# Patient Record
Sex: FEMALE | Race: BLACK OR AFRICAN AMERICAN | NOT HISPANIC OR LATINO
[De-identification: names, ages, dates, MRNs, and addresses within clinical notes are randomized per-mention and may not be internally consistent; named-entity substitution may affect disease eponyms.]

---

## 2017-09-22 ENCOUNTER — APPOINTMENT (OUTPATIENT)
Dept: OBGYN | Facility: CLINIC | Age: 22
End: 2017-09-22
Payer: COMMERCIAL

## 2017-09-22 VITALS
BODY MASS INDEX: 36 KG/M2 | SYSTOLIC BLOOD PRESSURE: 102 MMHG | HEIGHT: 66 IN | DIASTOLIC BLOOD PRESSURE: 67 MMHG | WEIGHT: 224 LBS | HEART RATE: 80 BPM

## 2017-09-22 DIAGNOSIS — Z83.3 FAMILY HISTORY OF DIABETES MELLITUS: ICD-10-CM

## 2017-09-22 DIAGNOSIS — Z30.09 ENCOUNTER FOR OTHER GENERAL COUNSELING AND ADVICE ON CONTRACEPTION: ICD-10-CM

## 2017-09-22 DIAGNOSIS — Z82.49 FAMILY HISTORY OF ISCHEMIC HEART DISEASE AND OTHER DISEASES OF THE CIRCULATORY SYSTEM: ICD-10-CM

## 2017-09-22 DIAGNOSIS — Z11.3 ENCOUNTER FOR SCREENING FOR INFECTIONS WITH A PREDOMINANTLY SEXUAL MODE OF TRANSMISSION: ICD-10-CM

## 2017-09-22 PROCEDURE — 99385 PREV VISIT NEW AGE 18-39: CPT

## 2017-09-24 PROBLEM — Z83.3 FAMILY HISTORY OF DIABETES MELLITUS: Status: ACTIVE | Noted: 2017-09-24

## 2017-09-24 PROBLEM — Z11.3 SCREENING FOR STDS (SEXUALLY TRANSMITTED DISEASES): Status: ACTIVE | Noted: 2017-09-22

## 2017-09-24 PROBLEM — Z30.09 COUNSELING FOR BIRTH CONTROL, ORAL CONTRACEPTIVES: Status: ACTIVE | Noted: 2017-09-22

## 2017-09-24 PROBLEM — Z82.49 FAMILY HISTORY OF HYPERTENSION: Status: ACTIVE | Noted: 2017-09-24

## 2017-09-26 LAB
C TRACH RRNA SPEC QL NAA+PROBE: NORMAL
CANDIDA VAG CYTO: NOT DETECTED
G VAGINALIS+PREV SP MTYP VAG QL MICRO: DETECTED
HBV SURFACE AG SER QL: NONREACTIVE
HCV AB SER QL: NONREACTIVE
HCV S/CO RATIO: 0.35 S/CO
HIV1+2 AB SPEC QL IA.RAPID: NONREACTIVE
N GONORRHOEA RRNA SPEC QL NAA+PROBE: NORMAL
SOURCE AMPLIFICATION: NORMAL
T PALLIDUM AB SER QL IA: NEGATIVE
T VAGINALIS VAG QL WET PREP: NOT DETECTED

## 2017-09-27 DIAGNOSIS — B96.89 ACUTE VAGINITIS: ICD-10-CM

## 2017-09-27 DIAGNOSIS — N76.0 ACUTE VAGINITIS: ICD-10-CM

## 2017-09-28 LAB — CYTOLOGY CVX/VAG DOC THIN PREP: NORMAL

## 2018-08-10 ENCOUNTER — RX RENEWAL (OUTPATIENT)
Age: 23
End: 2018-08-10

## 2019-02-06 ENCOUNTER — RX RENEWAL (OUTPATIENT)
Age: 24
End: 2019-02-06

## 2019-07-20 ENCOUNTER — RX RENEWAL (OUTPATIENT)
Age: 24
End: 2019-07-20

## 2019-09-17 ENCOUNTER — OTHER (OUTPATIENT)
Age: 24
End: 2019-09-17

## 2019-09-17 ENCOUNTER — APPOINTMENT (OUTPATIENT)
Dept: OBGYN | Facility: CLINIC | Age: 24
End: 2019-09-17
Payer: COMMERCIAL

## 2019-09-17 VITALS
DIASTOLIC BLOOD PRESSURE: 82 MMHG | HEART RATE: 69 BPM | SYSTOLIC BLOOD PRESSURE: 118 MMHG | HEIGHT: 66 IN | BODY MASS INDEX: 38.89 KG/M2 | WEIGHT: 242 LBS

## 2019-09-17 DIAGNOSIS — Z30.41 ENCOUNTER FOR SURVEILLANCE OF CONTRACEPTIVE PILLS: ICD-10-CM

## 2019-09-17 DIAGNOSIS — Z23 ENCOUNTER FOR IMMUNIZATION: ICD-10-CM

## 2019-09-17 DIAGNOSIS — N92.6 IRREGULAR MENSTRUATION, UNSPECIFIED: ICD-10-CM

## 2019-09-17 PROCEDURE — 99395 PREV VISIT EST AGE 18-39: CPT | Mod: 25

## 2019-09-17 PROCEDURE — 90649 4VHPV VACCINE 3 DOSE IM: CPT

## 2019-09-17 PROCEDURE — 90471 IMMUNIZATION ADMIN: CPT

## 2019-09-17 PROCEDURE — 99213 OFFICE O/P EST LOW 20 MIN: CPT | Mod: 25

## 2019-09-17 NOTE — COUNSELING
[Breast Self Exam] : breast self exam [Exercise] : exercise [Nutrition] : nutrition [Vitamins/Supplements] : vitamins/supplements [Contraception] : contraception [Vaccines] : vaccines

## 2019-09-19 LAB
C TRACH RRNA SPEC QL NAA+PROBE: DETECTED
CANDIDA VAG CYTO: NOT DETECTED
G VAGINALIS+PREV SP MTYP VAG QL MICRO: DETECTED
N GONORRHOEA RRNA SPEC QL NAA+PROBE: NOT DETECTED
SOURCE AMPLIFICATION: NORMAL
T VAGINALIS VAG QL WET PREP: NOT DETECTED

## 2019-09-26 ENCOUNTER — OTHER (OUTPATIENT)
Age: 24
End: 2019-09-26

## 2019-09-26 LAB — CYTOLOGY CVX/VAG DOC THIN PREP: ABNORMAL

## 2019-10-15 ENCOUNTER — OTHER (OUTPATIENT)
Age: 24
End: 2019-10-15

## 2019-10-29 ENCOUNTER — APPOINTMENT (OUTPATIENT)
Dept: OBGYN | Facility: CLINIC | Age: 24
End: 2019-10-29
Payer: COMMERCIAL

## 2019-10-29 VITALS
WEIGHT: 248 LBS | BODY MASS INDEX: 39.86 KG/M2 | DIASTOLIC BLOOD PRESSURE: 79 MMHG | HEIGHT: 66 IN | HEART RATE: 93 BPM | SYSTOLIC BLOOD PRESSURE: 127 MMHG

## 2019-10-29 PROCEDURE — 99213 OFFICE O/P EST LOW 20 MIN: CPT

## 2019-10-29 RX ORDER — METRONIDAZOLE 7.5 MG/G
0.75 GEL VAGINAL
Qty: 1 | Refills: 0 | Status: DISCONTINUED | COMMUNITY
Start: 2019-09-20 | End: 2019-10-29

## 2019-10-29 RX ORDER — METRONIDAZOLE 7.5 MG/G
0.75 GEL VAGINAL
Qty: 1 | Refills: 0 | Status: DISCONTINUED | COMMUNITY
Start: 2017-09-27 | End: 2019-10-29

## 2019-10-30 LAB
C TRACH RRNA SPEC QL NAA+PROBE: NOT DETECTED
N GONORRHOEA RRNA SPEC QL NAA+PROBE: NOT DETECTED
SOURCE AMPLIFICATION: NORMAL

## 2019-11-19 ENCOUNTER — APPOINTMENT (OUTPATIENT)
Dept: OBGYN | Facility: CLINIC | Age: 24
End: 2019-11-19

## 2019-11-26 ENCOUNTER — RESULT CHARGE (OUTPATIENT)
Age: 24
End: 2019-11-26

## 2019-11-26 ENCOUNTER — APPOINTMENT (OUTPATIENT)
Dept: OBGYN | Facility: CLINIC | Age: 24
End: 2019-11-26
Payer: COMMERCIAL

## 2019-11-26 VITALS
SYSTOLIC BLOOD PRESSURE: 117 MMHG | WEIGHT: 251 LBS | HEART RATE: 80 BPM | HEIGHT: 66 IN | BODY MASS INDEX: 40.34 KG/M2 | DIASTOLIC BLOOD PRESSURE: 77 MMHG

## 2019-11-26 LAB
HCG UR QL: NEGATIVE
QUALITY CONTROL: YES

## 2019-11-26 PROCEDURE — 90471 IMMUNIZATION ADMIN: CPT

## 2019-11-26 PROCEDURE — 57456 ENDOCERV CURETTAGE W/SCOPE: CPT

## 2019-11-26 PROCEDURE — 90651 9VHPV VACCINE 2/3 DOSE IM: CPT

## 2019-11-26 NOTE — PROCEDURE
[Colposcopy] : colposcopy [LGSIL] : low grade squamous intraepithelial lesion [SCJ Fully Visulized] : the squamocolumnar junction was fully visualized [ECC Done] : Endocervical curettage was performed.  [Silver Nitrate] : silver nitrate [Tolerated Well] : the patient tolerated the procedure well [No Complications] : there were no complications [Pap Performed] : a cervical Pap smear was not performed [Biopsies Taken: # ___] : no biopsies were taken of the cervix

## 2019-11-30 LAB — CORE LAB BIOPSY: NORMAL

## 2020-03-06 ENCOUNTER — APPOINTMENT (OUTPATIENT)
Dept: OBGYN | Facility: CLINIC | Age: 25
End: 2020-03-06

## 2020-03-11 ENCOUNTER — APPOINTMENT (OUTPATIENT)
Dept: OBGYN | Facility: CLINIC | Age: 25
End: 2020-03-11
Payer: COMMERCIAL

## 2020-03-11 PROCEDURE — 90471 IMMUNIZATION ADMIN: CPT

## 2020-03-11 PROCEDURE — 90649 4VHPV VACCINE 3 DOSE IM: CPT

## 2020-09-06 ENCOUNTER — RX RENEWAL (OUTPATIENT)
Age: 25
End: 2020-09-06

## 2020-12-15 PROBLEM — N76.0 BACTERIAL VAGINOSIS: Status: RESOLVED | Noted: 2017-09-27 | Resolved: 2020-12-15

## 2021-08-11 LAB — SARS-COV-2 N GENE NPH QL NAA+PROBE: NOT DETECTED

## 2021-10-05 ENCOUNTER — APPOINTMENT (OUTPATIENT)
Dept: OBGYN | Facility: CLINIC | Age: 26
End: 2021-10-05
Payer: SELF-PAY

## 2021-10-05 ENCOUNTER — ASOB RESULT (OUTPATIENT)
Age: 26
End: 2021-10-05

## 2021-10-05 ENCOUNTER — APPOINTMENT (OUTPATIENT)
Dept: OBGYN | Facility: CLINIC | Age: 26
End: 2021-10-05

## 2021-10-05 VITALS
WEIGHT: 247 LBS | DIASTOLIC BLOOD PRESSURE: 75 MMHG | HEART RATE: 71 BPM | SYSTOLIC BLOOD PRESSURE: 117 MMHG | TEMPERATURE: 96.7 F | BODY MASS INDEX: 39.7 KG/M2 | HEIGHT: 66 IN

## 2021-10-05 DIAGNOSIS — R87.612 LOW GRADE SQUAMOUS INTRAEPITHELIAL LESION ON CYTOLOGIC SMEAR OF CERVIX (LGSIL): ICD-10-CM

## 2021-10-05 DIAGNOSIS — E66.9 OBESITY, UNSPECIFIED: ICD-10-CM

## 2021-10-05 DIAGNOSIS — Z01.419 ENCOUNTER FOR GYNECOLOGICAL EXAMINATION (GENERAL) (ROUTINE) W/OUT ABNORMAL FINDINGS: ICD-10-CM

## 2021-10-05 PROCEDURE — 99395 PREV VISIT EST AGE 18-39: CPT

## 2021-10-05 PROCEDURE — 76817 TRANSVAGINAL US OBSTETRIC: CPT

## 2021-10-05 PROCEDURE — 99072 ADDL SUPL MATRL&STAF TM PHE: CPT

## 2021-10-05 NOTE — HISTORY OF PRESENT ILLNESS
[FreeTextEntry1] : 25 y.o. P 0010  LnMp 8/5/21 presents for annual exam. and confirmation of pregnancy, . pt reports monthly cycles. . pt feels  well. PMH - negative, PSHx - negative, FH - Hypercholesterolemia, Diabetes - pat. aunt. PGM, HTN- paternal side of family, mother was adopted. SH - negative, , GYN hx -  ETOP x 1. , + Chlamydia in 2019, LSIL on pap in 2019, \par ROS- [place] [facility]. - Cardiology,

## 2021-10-05 NOTE — DISCUSSION/SUMMARY
[FreeTextEntry1] : A - WWV\par      early iUP\par     obesity\par \par P- f/u 1 week for confirmation of viability \par      pap done\par      lab work done \par      exercise encouraged\par     nutritional counseling provided

## 2021-10-06 LAB
C TRACH RRNA SPEC QL NAA+PROBE: NOT DETECTED
HBV SURFACE AG SER QL: NONREACTIVE
HCV AB SER QL: NONREACTIVE
HCV S/CO RATIO: 0.46 S/CO
HIV1+2 AB SPEC QL IA.RAPID: NONREACTIVE
N GONORRHOEA RRNA SPEC QL NAA+PROBE: NOT DETECTED
SOURCE AMPLIFICATION: NORMAL
T PALLIDUM AB SER QL IA: NEGATIVE

## 2021-10-08 ENCOUNTER — APPOINTMENT (OUTPATIENT)
Dept: OBGYN | Facility: CLINIC | Age: 26
End: 2021-10-08
Payer: SELF-PAY

## 2021-10-08 ENCOUNTER — ASOB RESULT (OUTPATIENT)
Age: 26
End: 2021-10-08

## 2021-10-08 VITALS
HEIGHT: 66 IN | SYSTOLIC BLOOD PRESSURE: 134 MMHG | HEART RATE: 71 BPM | WEIGHT: 245 LBS | BODY MASS INDEX: 39.37 KG/M2 | DIASTOLIC BLOOD PRESSURE: 76 MMHG

## 2021-10-08 LAB — BACTERIA UR CULT: NORMAL

## 2021-10-08 PROCEDURE — 76830 TRANSVAGINAL US NON-OB: CPT

## 2021-10-08 PROCEDURE — 99072 ADDL SUPL MATRL&STAF TM PHE: CPT

## 2021-10-08 PROCEDURE — 99213 OFFICE O/P EST LOW 20 MIN: CPT

## 2021-10-08 NOTE — DISCUSSION/SUMMARY
[FreeTextEntry1] : A - Threatened Ab\par \par P- - sono today, shows viable, IUP at 6 weeks 2 days, \par      EDC by sono is 22. \par    pt counselled on Threatened , and sxs for which to call back for over the weekend. pt has appt. on Monday, 10/11 for repeat sono\par     initial prenatal labs not drawn as of yet. \par     will continue to observe for risk of spont. ab.

## 2021-10-08 NOTE — HISTORY OF PRESENT ILLNESS
[FreeTextEntry1] : pt presents for follow up, , recently seen on 10/5/21, pt experienced vaginal bleeding, with passage of clots. over the past 48 hrs. pt is not actively bleeding now.

## 2021-10-11 ENCOUNTER — APPOINTMENT (OUTPATIENT)
Dept: OBGYN | Facility: CLINIC | Age: 26
End: 2021-10-11
Payer: SELF-PAY

## 2021-10-11 ENCOUNTER — ASOB RESULT (OUTPATIENT)
Age: 26
End: 2021-10-11

## 2021-10-11 VITALS
DIASTOLIC BLOOD PRESSURE: 78 MMHG | WEIGHT: 244 LBS | HEIGHT: 66 IN | BODY MASS INDEX: 39.21 KG/M2 | SYSTOLIC BLOOD PRESSURE: 113 MMHG | HEART RATE: 69 BPM

## 2021-10-11 DIAGNOSIS — O20.0 THREATENED ABORTION: ICD-10-CM

## 2021-10-11 LAB — CYTOLOGY CVX/VAG DOC THIN PREP: ABNORMAL

## 2021-10-11 PROCEDURE — 76817 TRANSVAGINAL US OBSTETRIC: CPT

## 2021-10-11 PROCEDURE — 99072 ADDL SUPL MATRL&STAF TM PHE: CPT

## 2021-10-11 PROCEDURE — 99213 OFFICE O/P EST LOW 20 MIN: CPT

## 2021-10-11 NOTE — HISTORY OF PRESENT ILLNESS
[FreeTextEntry1] : pt presents  for follow up, pt reports no significant interval change over the weekend, in terms of vaginal bleeding,

## 2021-10-11 NOTE — DISCUSSION/SUMMARY
[FreeTextEntry1] : A - Threatened Ab\par \par P- sono today, shows no significant, change between the two sonos done last week. \par      FHR  is intermittent and faint. \par     pt and partner counselled that findings are suggestive of an early miscarriage, waiting to happen.\par     all questions answered, \par     f/u 1 week for sono and RPA.

## 2021-10-19 ENCOUNTER — APPOINTMENT (OUTPATIENT)
Dept: OBGYN | Facility: CLINIC | Age: 26
End: 2021-10-19
Payer: SELF-PAY

## 2021-10-19 ENCOUNTER — ASOB RESULT (OUTPATIENT)
Age: 26
End: 2021-10-19

## 2021-10-19 VITALS
SYSTOLIC BLOOD PRESSURE: 124 MMHG | HEIGHT: 66 IN | DIASTOLIC BLOOD PRESSURE: 79 MMHG | BODY MASS INDEX: 40.02 KG/M2 | WEIGHT: 249 LBS | HEART RATE: 72 BPM

## 2021-10-19 PROCEDURE — 99072 ADDL SUPL MATRL&STAF TM PHE: CPT

## 2021-10-19 PROCEDURE — 99213 OFFICE O/P EST LOW 20 MIN: CPT

## 2021-10-19 PROCEDURE — 76817 TRANSVAGINAL US OBSTETRIC: CPT

## 2021-10-19 NOTE — HISTORY OF PRESENT ILLNESS
[FreeTextEntry1] : pt presents for follow up. since visit last week, , on Thursday, 10/14 , pt had heavy bleeding with clots. , pt states that cramping has abated ,and feels well today.

## 2022-01-03 LAB — SARS-COV-2 N GENE NPH QL NAA+PROBE: NOT DETECTED

## 2022-01-04 LAB — SARS-COV-2 N GENE NPH QL NAA+PROBE: NOT DETECTED

## 2022-01-06 ENCOUNTER — APPOINTMENT (OUTPATIENT)
Dept: CARDIOLOGY | Facility: CLINIC | Age: 27
End: 2022-01-06
Payer: COMMERCIAL

## 2022-01-06 VITALS
TEMPERATURE: 98 F | DIASTOLIC BLOOD PRESSURE: 82 MMHG | HEART RATE: 62 BPM | RESPIRATION RATE: 16 BRPM | HEIGHT: 67 IN | WEIGHT: 249 LBS | OXYGEN SATURATION: 98 % | BODY MASS INDEX: 39.08 KG/M2 | SYSTOLIC BLOOD PRESSURE: 129 MMHG

## 2022-01-06 DIAGNOSIS — J06.9 ACUTE UPPER RESPIRATORY INFECTION, UNSPECIFIED: ICD-10-CM

## 2022-01-06 PROCEDURE — 99072 ADDL SUPL MATRL&STAF TM PHE: CPT

## 2022-01-06 PROCEDURE — 99211K: CUSTOM

## 2022-01-06 NOTE — DISCUSSION/SUMMARY
[FreeTextEntry1] : This is a 26-year-old female who comes in today complaining of sore throat, productive cough, and nasal congestion.  She denies shortness of breath, dizziness, palpitations.\par She has been on a Z-Rodri for the last 2 days.  Her throat is red without exudate.  She will continue on her Z-Rodri, increase her hydration, use Tylenol every 6-8 hours, and use Claritin-D as a decongestant for the next 5 to 6 days.\par She is instructed to follow-up with her primary care physician.

## 2022-01-06 NOTE — REVIEW OF SYSTEMS
[Fever] : no fever [Chills] : no chills [Feeling Fatigued] : feeling fatigued [Negative] : Heme/Lymph

## 2022-01-06 NOTE — PHYSICAL EXAM

## 2022-04-01 ENCOUNTER — ASOB RESULT (OUTPATIENT)
Age: 27
End: 2022-04-01

## 2022-04-01 ENCOUNTER — APPOINTMENT (OUTPATIENT)
Dept: ANTEPARTUM | Facility: CLINIC | Age: 27
End: 2022-04-01
Payer: COMMERCIAL

## 2022-04-01 PROCEDURE — 76801 OB US < 14 WKS SINGLE FETUS: CPT

## 2022-05-02 ENCOUNTER — ASOB RESULT (OUTPATIENT)
Age: 27
End: 2022-05-02

## 2022-05-02 ENCOUNTER — APPOINTMENT (OUTPATIENT)
Dept: ANTEPARTUM | Facility: CLINIC | Age: 27
End: 2022-05-02
Payer: COMMERCIAL

## 2022-05-02 PROCEDURE — 76811 OB US DETAILED SNGL FETUS: CPT

## 2022-05-10 ENCOUNTER — APPOINTMENT (OUTPATIENT)
Dept: ANTEPARTUM | Facility: CLINIC | Age: 27
End: 2022-05-10

## 2022-05-13 ENCOUNTER — ASOB RESULT (OUTPATIENT)
Age: 27
End: 2022-05-13

## 2022-05-13 ENCOUNTER — APPOINTMENT (OUTPATIENT)
Dept: ANTEPARTUM | Facility: CLINIC | Age: 27
End: 2022-05-13
Payer: COMMERCIAL

## 2022-05-13 PROCEDURE — 76815 OB US LIMITED FETUS(S): CPT

## 2022-05-19 ENCOUNTER — APPOINTMENT (OUTPATIENT)
Dept: ANTEPARTUM | Facility: CLINIC | Age: 27
End: 2022-05-19
Payer: COMMERCIAL

## 2022-05-19 ENCOUNTER — ASOB RESULT (OUTPATIENT)
Age: 27
End: 2022-05-19

## 2022-05-19 PROCEDURE — 76816 OB US FOLLOW-UP PER FETUS: CPT

## 2022-06-30 ENCOUNTER — APPOINTMENT (OUTPATIENT)
Dept: ANTEPARTUM | Facility: CLINIC | Age: 27
End: 2022-06-30

## 2022-06-30 ENCOUNTER — ASOB RESULT (OUTPATIENT)
Age: 27
End: 2022-06-30

## 2022-06-30 PROCEDURE — 76816 OB US FOLLOW-UP PER FETUS: CPT

## 2022-06-30 PROCEDURE — 76819 FETAL BIOPHYS PROFIL W/O NST: CPT

## 2022-07-28 ENCOUNTER — APPOINTMENT (OUTPATIENT)
Dept: ANTEPARTUM | Facility: CLINIC | Age: 27
End: 2022-07-28

## 2022-07-28 ENCOUNTER — ASOB RESULT (OUTPATIENT)
Age: 27
End: 2022-07-28

## 2022-07-28 PROCEDURE — 76816 OB US FOLLOW-UP PER FETUS: CPT

## 2022-08-25 ENCOUNTER — APPOINTMENT (OUTPATIENT)
Dept: ANTEPARTUM | Facility: CLINIC | Age: 27
End: 2022-08-25

## 2022-08-25 ENCOUNTER — ASOB RESULT (OUTPATIENT)
Age: 27
End: 2022-08-25

## 2022-08-25 PROCEDURE — 76816 OB US FOLLOW-UP PER FETUS: CPT

## 2022-08-25 PROCEDURE — 76818 FETAL BIOPHYS PROFILE W/NST: CPT

## 2022-09-02 ENCOUNTER — APPOINTMENT (OUTPATIENT)
Dept: ANTEPARTUM | Facility: CLINIC | Age: 27
End: 2022-09-02

## 2022-09-02 ENCOUNTER — ASOB RESULT (OUTPATIENT)
Age: 27
End: 2022-09-02

## 2022-09-02 PROCEDURE — 76818 FETAL BIOPHYS PROFILE W/NST: CPT

## 2022-09-08 ENCOUNTER — ASOB RESULT (OUTPATIENT)
Age: 27
End: 2022-09-08

## 2022-09-08 ENCOUNTER — APPOINTMENT (OUTPATIENT)
Dept: ANTEPARTUM | Facility: CLINIC | Age: 27
End: 2022-09-08

## 2022-09-08 PROCEDURE — 76818 FETAL BIOPHYS PROFILE W/NST: CPT

## 2022-09-11 ENCOUNTER — INPATIENT (INPATIENT)
Facility: HOSPITAL | Age: 27
LOS: 2 days | Discharge: HOME | End: 2022-09-14
Attending: OBSTETRICS & GYNECOLOGY | Admitting: OBSTETRICS & GYNECOLOGY

## 2022-09-11 VITALS — TEMPERATURE: 98 F | DIASTOLIC BLOOD PRESSURE: 80 MMHG | HEART RATE: 81 BPM | SYSTOLIC BLOOD PRESSURE: 144 MMHG

## 2022-09-11 DIAGNOSIS — O26.899 OTHER SPECIFIED PREGNANCY RELATED CONDITIONS, UNSPECIFIED TRIMESTER: ICD-10-CM

## 2022-09-11 DIAGNOSIS — Z3A.00 WEEKS OF GESTATION OF PREGNANCY NOT SPECIFIED: ICD-10-CM

## 2022-09-11 RX ORDER — CHLORHEXIDINE GLUCONATE 213 G/1000ML
1 SOLUTION TOPICAL ONCE
Refills: 0 | Status: DISCONTINUED | OUTPATIENT
Start: 2022-09-11 | End: 2022-09-12

## 2022-09-11 RX ORDER — SODIUM CHLORIDE 9 MG/ML
1000 INJECTION, SOLUTION INTRAVENOUS
Refills: 0 | Status: DISCONTINUED | OUTPATIENT
Start: 2022-09-11 | End: 2022-09-12

## 2022-09-11 RX ORDER — OXYTOCIN 10 UNIT/ML
333.33 VIAL (ML) INJECTION
Qty: 20 | Refills: 0 | Status: DISCONTINUED | OUTPATIENT
Start: 2022-09-11 | End: 2022-09-12

## 2022-09-11 NOTE — OB PROVIDER H&P - ASSESSMENT
's patient is a 25 y/o EDC 2022 EGA 39   shows evidence of active labor  - admit to labor and delivery, discussed with /OB GYN resident team  - HELLP labs ordered, results pending  - admission consents obtained  - admission labs ordered  - COVID PCR obtained and sent

## 2022-09-11 NOTE — OB PROVIDER TRIAGE NOTE - NSOBPROVIDERNOTE_OBGYN_ALL_OB_FT
's patient is a 27 y/o EDC 2022 EGA 39   shows evidence of active labor  - admit to labor and delivery, discussed with   - SERGIO labs ordered, results pending  - admission consents obtained  - admission labs ordered  - COVID PCR obtained and sent  - PRBC x 2 units  2355 Order placed for RN to place second IV for increased contraction frequency

## 2022-09-11 NOTE — OB PROVIDER TRIAGE NOTE - NSHPPHYSICALEXAM_GEN_ALL_CORE
Vital Signs Last 24 Hrs  T(C): 36.7 (11 Sep 2022 22:57), Max: 36.7 (11 Sep 2022 22:49)  T(F): 98.1 (11 Sep 2022 22:57), Max: 98.1 (11 Sep 2022 22:57)  HR: 93 (11 Sep 2022 23:35) (81 - 93)  BP: 138/82 (11 Sep 2022 23:35) (138/82 - 176/84)  RR: 16 (11 Sep 2022 22:57) (16 - 16)  TAS: cephalic presentation  FHR: 135 baseline with moderate variability, accelerations present, no decelerations  CTX: irregular  SVE: 5/80/-2  EFW 3629

## 2022-09-11 NOTE — OB PROVIDER H&P - PROBLEM SELECTOR PLAN 1
's patient is a 25 y/o EDC 2022 EGA 39   shows evidence of active labor  - admit to labor and delivery, discussed with   - SERGIO labs ordered, results pending  - admission consents obtained  - admission labs ordered  - COVID PCR obtained and sent

## 2022-09-11 NOTE — OB PROVIDER TRIAGE NOTE - HISTORY OF PRESENT ILLNESS
's patient is a 25 y/o EDC 2022 EGA 39   reports of contractions starting an hour ago, vaginal spotting present. Patient reports of fetal movement. Denies leaking of fluid.     GBS status: negative 2022  Medical History:  - elevated BMI  - Asthma, denies hospitalizations, no medications, no intubations  Surgical History: Denies  OBGYN History: SAB, complete

## 2022-09-11 NOTE — OB PROVIDER H&P - HISTORY OF PRESENT ILLNESS
's patient is a 25 y/o EDC 2022 EGA 39   reports of contractions starting an hour ago, vaginal spotting present. Patient unable to verbalize contraction frequency/interval. Patient reports of fetal movement. Denies leaking of fluid.     GBS status: negative 2022  Medical History:  - elevated BMI  - Asthma, denies hospitalizations, no medications, no intubations  Surgical History: Denies  OBGYN History: SAB, complete

## 2022-09-11 NOTE — OB PROVIDER H&P - ATTENDING COMMENTS
OB Attending Note    P0 presents in labor.  Expectant management.  Reassuirng fetal status.     DANIEL Greenberg MD

## 2022-09-11 NOTE — OB PROVIDER H&P - NS_FHRDECEL_OBGYN_ALL_OB
No Decelerations Libtayo Counseling- I discussed with the patient the risks of Libtayo including but not limited to nausea, vomiting, diarrhea, and bone or muscle pain.  The patient verbalized understanding of the proper use and possible adverse effects of Libtayo.  All of the patient's questions and concerns were addressed.

## 2022-09-12 ENCOUNTER — TRANSCRIPTION ENCOUNTER (OUTPATIENT)
Age: 27
End: 2022-09-12

## 2022-09-12 LAB
ALBUMIN SERPL ELPH-MCNC: 4 G/DL — SIGNIFICANT CHANGE UP (ref 3.3–5)
ALP SERPL-CCNC: 151 U/L — HIGH (ref 40–120)
ALT FLD-CCNC: 11 U/L — SIGNIFICANT CHANGE UP (ref 4–33)
ANION GAP SERPL CALC-SCNC: 14 MMOL/L — SIGNIFICANT CHANGE UP (ref 7–14)
APPEARANCE UR: ABNORMAL
APTT BLD: 28 SEC — SIGNIFICANT CHANGE UP (ref 27–36.3)
AST SERPL-CCNC: 14 U/L — SIGNIFICANT CHANGE UP (ref 4–32)
BACTERIA # UR AUTO: ABNORMAL
BASOPHILS # BLD AUTO: 0.04 K/UL — SIGNIFICANT CHANGE UP (ref 0–0.2)
BASOPHILS NFR BLD AUTO: 0.3 % — SIGNIFICANT CHANGE UP (ref 0–2)
BILIRUB SERPL-MCNC: 0.2 MG/DL — SIGNIFICANT CHANGE UP (ref 0.2–1.2)
BILIRUB UR-MCNC: NEGATIVE — SIGNIFICANT CHANGE UP
BLD GP AB SCN SERPL QL: NEGATIVE — SIGNIFICANT CHANGE UP
BUN SERPL-MCNC: 6 MG/DL — LOW (ref 7–23)
CALCIUM SERPL-MCNC: 9.8 MG/DL — SIGNIFICANT CHANGE UP (ref 8.4–10.5)
CHLORIDE SERPL-SCNC: 102 MMOL/L — SIGNIFICANT CHANGE UP (ref 98–107)
CO2 SERPL-SCNC: 21 MMOL/L — LOW (ref 22–31)
COLOR SPEC: ABNORMAL
COVID-19 SPIKE DOMAIN AB INTERP: POSITIVE
COVID-19 SPIKE DOMAIN ANTIBODY RESULT: >250 U/ML — HIGH
CREAT ?TM UR-MCNC: 71 MG/DL — SIGNIFICANT CHANGE UP
CREAT SERPL-MCNC: 0.61 MG/DL — SIGNIFICANT CHANGE UP (ref 0.5–1.3)
DIFF PNL FLD: ABNORMAL
EGFR: 126 ML/MIN/1.73M2 — SIGNIFICANT CHANGE UP
EOSINOPHIL # BLD AUTO: 0.1 K/UL — SIGNIFICANT CHANGE UP (ref 0–0.5)
EOSINOPHIL NFR BLD AUTO: 0.8 % — SIGNIFICANT CHANGE UP (ref 0–6)
EPI CELLS # UR: 7 /HPF — HIGH (ref 0–5)
FIBRINOGEN PPP-MCNC: 921 MG/DL — HIGH (ref 330–520)
GLUCOSE SERPL-MCNC: 105 MG/DL — HIGH (ref 70–99)
GLUCOSE UR QL: NEGATIVE — SIGNIFICANT CHANGE UP
HCT VFR BLD CALC: 37.9 % — SIGNIFICANT CHANGE UP (ref 34.5–45)
HGB BLD-MCNC: 11.8 G/DL — SIGNIFICANT CHANGE UP (ref 11.5–15.5)
HYALINE CASTS # UR AUTO: 3 /LPF — SIGNIFICANT CHANGE UP (ref 0–7)
IANC: 8.35 K/UL — HIGH (ref 1.8–7.4)
IMM GRANULOCYTES NFR BLD AUTO: 0.6 % — SIGNIFICANT CHANGE UP (ref 0–1.5)
INR BLD: 0.94 RATIO — SIGNIFICANT CHANGE UP (ref 0.88–1.16)
KETONES UR-MCNC: NEGATIVE — SIGNIFICANT CHANGE UP
LDH SERPL L TO P-CCNC: 187 U/L — SIGNIFICANT CHANGE UP (ref 135–225)
LEUKOCYTE ESTERASE UR-ACNC: ABNORMAL
LYMPHOCYTES # BLD AUTO: 2.51 K/UL — SIGNIFICANT CHANGE UP (ref 1–3.3)
LYMPHOCYTES # BLD AUTO: 21 % — SIGNIFICANT CHANGE UP (ref 13–44)
MAGNESIUM SERPL-MCNC: 3 MG/DL — HIGH (ref 1.6–2.6)
MAGNESIUM SERPL-MCNC: 4.1 MG/DL — HIGH (ref 1.6–2.6)
MAGNESIUM SERPL-MCNC: 4.4 MG/DL — HIGH (ref 1.6–2.6)
MCHC RBC-ENTMCNC: 27.3 PG — SIGNIFICANT CHANGE UP (ref 27–34)
MCHC RBC-ENTMCNC: 31.1 GM/DL — LOW (ref 32–36)
MCV RBC AUTO: 87.5 FL — SIGNIFICANT CHANGE UP (ref 80–100)
MONOCYTES # BLD AUTO: 0.87 K/UL — SIGNIFICANT CHANGE UP (ref 0–0.9)
MONOCYTES NFR BLD AUTO: 7.3 % — SIGNIFICANT CHANGE UP (ref 2–14)
NEUTROPHILS # BLD AUTO: 8.35 K/UL — HIGH (ref 1.8–7.4)
NEUTROPHILS NFR BLD AUTO: 70 % — SIGNIFICANT CHANGE UP (ref 43–77)
NITRITE UR-MCNC: NEGATIVE — SIGNIFICANT CHANGE UP
NRBC # BLD: 0 /100 WBCS — SIGNIFICANT CHANGE UP (ref 0–0)
NRBC # FLD: 0 K/UL — SIGNIFICANT CHANGE UP (ref 0–0)
PH UR: 7 — SIGNIFICANT CHANGE UP (ref 5–8)
PLATELET # BLD AUTO: 306 K/UL — SIGNIFICANT CHANGE UP (ref 150–400)
POTASSIUM SERPL-MCNC: 3.8 MMOL/L — SIGNIFICANT CHANGE UP (ref 3.5–5.3)
POTASSIUM SERPL-SCNC: 3.8 MMOL/L — SIGNIFICANT CHANGE UP (ref 3.5–5.3)
PROT ?TM UR-MCNC: 26 MG/DL — SIGNIFICANT CHANGE UP
PROT ?TM UR-MCNC: 26 MG/DL — SIGNIFICANT CHANGE UP
PROT SERPL-MCNC: 7.6 G/DL — SIGNIFICANT CHANGE UP (ref 6–8.3)
PROT UR-MCNC: ABNORMAL
PROT/CREAT UR-RTO: 0.4 RATIO — HIGH (ref 0–0.2)
PROTHROM AB SERPL-ACNC: 10.9 SEC — SIGNIFICANT CHANGE UP (ref 10.5–13.4)
RBC # BLD: 4.33 M/UL — SIGNIFICANT CHANGE UP (ref 3.8–5.2)
RBC # FLD: 15.7 % — HIGH (ref 10.3–14.5)
RBC CASTS # UR COMP ASSIST: 59 /HPF — HIGH (ref 0–4)
RH IG SCN BLD-IMP: POSITIVE — SIGNIFICANT CHANGE UP
RH IG SCN BLD-IMP: POSITIVE — SIGNIFICANT CHANGE UP
SARS-COV-2 IGG+IGM SERPL QL IA: >250 U/ML — HIGH
SARS-COV-2 IGG+IGM SERPL QL IA: POSITIVE
SARS-COV-2 RNA SPEC QL NAA+PROBE: SIGNIFICANT CHANGE UP
SODIUM SERPL-SCNC: 137 MMOL/L — SIGNIFICANT CHANGE UP (ref 135–145)
SP GR SPEC: 1.01 — SIGNIFICANT CHANGE UP (ref 1.01–1.05)
T PALLIDUM AB TITR SER: NEGATIVE — SIGNIFICANT CHANGE UP
URATE SERPL-MCNC: 5.1 MG/DL — SIGNIFICANT CHANGE UP (ref 2.5–7)
UROBILINOGEN FLD QL: SIGNIFICANT CHANGE UP
WBC # BLD: 11.94 K/UL — HIGH (ref 3.8–10.5)
WBC # FLD AUTO: 11.94 K/UL — HIGH (ref 3.8–10.5)
WBC UR QL: 40 /HPF — HIGH (ref 0–5)

## 2022-09-12 RX ORDER — DIBUCAINE 1 %
1 OINTMENT (GRAM) RECTAL EVERY 6 HOURS
Refills: 0 | Status: DISCONTINUED | OUTPATIENT
Start: 2022-09-12 | End: 2022-09-14

## 2022-09-12 RX ORDER — KETOROLAC TROMETHAMINE 30 MG/ML
30 SYRINGE (ML) INJECTION ONCE
Refills: 0 | Status: DISCONTINUED | OUTPATIENT
Start: 2022-09-12 | End: 2022-09-12

## 2022-09-12 RX ORDER — SIMETHICONE 80 MG/1
80 TABLET, CHEWABLE ORAL EVERY 4 HOURS
Refills: 0 | Status: DISCONTINUED | OUTPATIENT
Start: 2022-09-12 | End: 2022-09-14

## 2022-09-12 RX ORDER — TETANUS TOXOID, REDUCED DIPHTHERIA TOXOID AND ACELLULAR PERTUSSIS VACCINE, ADSORBED 5; 2.5; 8; 8; 2.5 [IU]/.5ML; [IU]/.5ML; UG/.5ML; UG/.5ML; UG/.5ML
0.5 SUSPENSION INTRAMUSCULAR ONCE
Refills: 0 | Status: DISCONTINUED | OUTPATIENT
Start: 2022-09-12 | End: 2022-09-14

## 2022-09-12 RX ORDER — LANOLIN
1 OINTMENT (GRAM) TOPICAL EVERY 6 HOURS
Refills: 0 | Status: DISCONTINUED | OUTPATIENT
Start: 2022-09-12 | End: 2022-09-14

## 2022-09-12 RX ORDER — IBUPROFEN 200 MG
1 TABLET ORAL
Qty: 0 | Refills: 0 | DISCHARGE
Start: 2022-09-12

## 2022-09-12 RX ORDER — ACETAMINOPHEN 500 MG
3 TABLET ORAL
Qty: 0 | Refills: 0 | DISCHARGE
Start: 2022-09-12

## 2022-09-12 RX ORDER — IBUPROFEN 200 MG
600 TABLET ORAL EVERY 6 HOURS
Refills: 0 | Status: DISCONTINUED | OUTPATIENT
Start: 2022-09-12 | End: 2022-09-14

## 2022-09-12 RX ORDER — OXYTOCIN 10 UNIT/ML
333.33 VIAL (ML) INJECTION
Qty: 20 | Refills: 0 | Status: DISCONTINUED | OUTPATIENT
Start: 2022-09-12 | End: 2022-09-13

## 2022-09-12 RX ORDER — NIFEDIPINE 30 MG
30 TABLET, EXTENDED RELEASE 24 HR ORAL DAILY
Refills: 0 | Status: DISCONTINUED | OUTPATIENT
Start: 2022-09-12 | End: 2022-09-14

## 2022-09-12 RX ORDER — PRAMOXINE HYDROCHLORIDE 150 MG/15G
1 AEROSOL, FOAM RECTAL EVERY 4 HOURS
Refills: 0 | Status: DISCONTINUED | OUTPATIENT
Start: 2022-09-12 | End: 2022-09-14

## 2022-09-12 RX ORDER — AER TRAVELER 0.5 G/1
1 SOLUTION RECTAL; TOPICAL EVERY 4 HOURS
Refills: 0 | Status: DISCONTINUED | OUTPATIENT
Start: 2022-09-12 | End: 2022-09-14

## 2022-09-12 RX ORDER — OXYCODONE HYDROCHLORIDE 5 MG/1
5 TABLET ORAL
Refills: 0 | Status: DISCONTINUED | OUTPATIENT
Start: 2022-09-12 | End: 2022-09-14

## 2022-09-12 RX ORDER — MAGNESIUM SULFATE 500 MG/ML
2 VIAL (ML) INJECTION
Qty: 40 | Refills: 0 | Status: DISCONTINUED | OUTPATIENT
Start: 2022-09-12 | End: 2022-09-12

## 2022-09-12 RX ORDER — IBUPROFEN 200 MG
600 TABLET ORAL EVERY 6 HOURS
Refills: 0 | Status: COMPLETED | OUTPATIENT
Start: 2022-09-12 | End: 2023-08-11

## 2022-09-12 RX ORDER — ACETAMINOPHEN 500 MG
975 TABLET ORAL
Refills: 0 | Status: DISCONTINUED | OUTPATIENT
Start: 2022-09-12 | End: 2022-09-14

## 2022-09-12 RX ORDER — MAGNESIUM SULFATE 500 MG/ML
2 VIAL (ML) INJECTION
Qty: 40 | Refills: 0 | Status: DISCONTINUED | OUTPATIENT
Start: 2022-09-12 | End: 2022-09-13

## 2022-09-12 RX ORDER — SODIUM CHLORIDE 9 MG/ML
1000 INJECTION, SOLUTION INTRAVENOUS
Refills: 0 | Status: DISCONTINUED | OUTPATIENT
Start: 2022-09-12 | End: 2022-09-13

## 2022-09-12 RX ORDER — HYDROCORTISONE 1 %
1 OINTMENT (GRAM) TOPICAL EVERY 6 HOURS
Refills: 0 | Status: DISCONTINUED | OUTPATIENT
Start: 2022-09-12 | End: 2022-09-14

## 2022-09-12 RX ORDER — BENZOCAINE 10 %
1 GEL (GRAM) MUCOUS MEMBRANE EVERY 6 HOURS
Refills: 0 | Status: DISCONTINUED | OUTPATIENT
Start: 2022-09-12 | End: 2022-09-14

## 2022-09-12 RX ORDER — SODIUM CHLORIDE 9 MG/ML
3 INJECTION INTRAMUSCULAR; INTRAVENOUS; SUBCUTANEOUS EVERY 8 HOURS
Refills: 0 | Status: DISCONTINUED | OUTPATIENT
Start: 2022-09-12 | End: 2022-09-14

## 2022-09-12 RX ORDER — DIPHENHYDRAMINE HCL 50 MG
25 CAPSULE ORAL EVERY 6 HOURS
Refills: 0 | Status: DISCONTINUED | OUTPATIENT
Start: 2022-09-12 | End: 2022-09-14

## 2022-09-12 RX ORDER — NIFEDIPINE 30 MG
10 TABLET, EXTENDED RELEASE 24 HR ORAL ONCE
Refills: 0 | Status: COMPLETED | OUTPATIENT
Start: 2022-09-12 | End: 2022-09-12

## 2022-09-12 RX ORDER — MAGNESIUM HYDROXIDE 400 MG/1
30 TABLET, CHEWABLE ORAL
Refills: 0 | Status: DISCONTINUED | OUTPATIENT
Start: 2022-09-12 | End: 2022-09-14

## 2022-09-12 RX ORDER — MAGNESIUM SULFATE 500 MG/ML
4 VIAL (ML) INJECTION ONCE
Refills: 0 | Status: COMPLETED | OUTPATIENT
Start: 2022-09-12 | End: 2022-09-12

## 2022-09-12 RX ORDER — MORPHINE SULFATE 50 MG/1
4 CAPSULE, EXTENDED RELEASE ORAL ONCE
Refills: 0 | Status: DISCONTINUED | OUTPATIENT
Start: 2022-09-12 | End: 2022-09-12

## 2022-09-12 RX ORDER — OXYCODONE HYDROCHLORIDE 5 MG/1
5 TABLET ORAL ONCE
Refills: 0 | Status: DISCONTINUED | OUTPATIENT
Start: 2022-09-12 | End: 2022-09-14

## 2022-09-12 RX ADMIN — Medication 30 MILLIGRAM(S): at 03:01

## 2022-09-12 RX ADMIN — PRAMOXINE HYDROCHLORIDE 1 APPLICATION(S): 150 AEROSOL, FOAM RECTAL at 20:07

## 2022-09-12 RX ADMIN — Medication 10 MILLIGRAM(S): at 01:29

## 2022-09-12 RX ADMIN — Medication 600 MILLIGRAM(S): at 13:15

## 2022-09-12 RX ADMIN — MORPHINE SULFATE 4 MILLIGRAM(S): 50 CAPSULE, EXTENDED RELEASE ORAL at 05:12

## 2022-09-12 RX ADMIN — Medication 50 GM/HR: at 01:51

## 2022-09-12 RX ADMIN — Medication 975 MILLIGRAM(S): at 10:04

## 2022-09-12 RX ADMIN — Medication 975 MILLIGRAM(S): at 10:30

## 2022-09-12 RX ADMIN — Medication 50 GM/HR: at 19:01

## 2022-09-12 RX ADMIN — Medication 50 GM/HR: at 10:31

## 2022-09-12 RX ADMIN — Medication 300 GRAM(S): at 01:28

## 2022-09-12 RX ADMIN — Medication 600 MILLIGRAM(S): at 18:23

## 2022-09-12 RX ADMIN — Medication 975 MILLIGRAM(S): at 20:43

## 2022-09-12 RX ADMIN — Medication 975 MILLIGRAM(S): at 21:40

## 2022-09-12 RX ADMIN — SODIUM CHLORIDE 125 MILLILITER(S): 9 INJECTION, SOLUTION INTRAVENOUS at 01:21

## 2022-09-12 RX ADMIN — Medication 1 APPLICATION(S): at 20:07

## 2022-09-12 RX ADMIN — Medication 600 MILLIGRAM(S): at 12:30

## 2022-09-12 RX ADMIN — Medication 600 MILLIGRAM(S): at 17:46

## 2022-09-12 RX ADMIN — SODIUM CHLORIDE 50 MILLILITER(S): 9 INJECTION, SOLUTION INTRAVENOUS at 11:52

## 2022-09-12 RX ADMIN — Medication 1 TABLET(S): at 12:22

## 2022-09-12 NOTE — OB RN PATIENT PROFILE - FALL HARM RISK - UNIVERSAL INTERVENTIONS
Bed in lowest position, wheels locked, appropriate side rails in place/Call bell, personal items and telephone in reach/Instruct patient to call for assistance before getting out of bed or chair/Non-slip footwear when patient is out of bed/[place] to call system/Physically safe environment - no spills, clutter or unnecessary equipment/Purposeful Proactive Rounding/Room/bathroom lighting operational, light cord in reach

## 2022-09-12 NOTE — DISCHARGE NOTE OB - MEDICATION SUMMARY - MEDICATIONS TO TAKE
I will START or STAY ON the medications listed below when I get home from the hospital:    acetaminophen 325 mg oral tablet  -- 3 tab(s) by mouth every 6 hours  -- Indication: For Pain    ibuprofen 600 mg oral tablet  -- 1 tab(s) by mouth every 6 hours  -- Indication: For Pain    PNV Prenatal oral tablet  -- 1 tab(s) by mouth once a day  -- Indication: For Meds   I will START or STAY ON the medications listed below when I get home from the hospital:    acetaminophen 325 mg oral tablet  -- 3 tab(s) by mouth every 6 hours  -- Indication: For Pain    ibuprofen 600 mg oral tablet  -- 1 tab(s) by mouth every 6 hours  -- Indication: For Pain    NIFEdipine 30 mg oral tablet, extended release  -- 1 tab(s) by mouth once a day  -- Indication: For blood pressure management    PNV Prenatal oral tablet  -- 1 tab(s) by mouth once a day  -- Indication: For Meds

## 2022-09-12 NOTE — OB PROVIDER DELIVERY SUMMARY - NSPROVIDERDELIVERYNOTE_OBGYN_ALL_OB_FT
of viable infant.  Head, shoulders and body delivered easily.  Cord clamped and cut after delayed cord clamping was performed.   Placenta delivered intact.  Vaginal exam revealed intact cervix, vaginal walls and sulci.  2nd degree laceration identified and repaired in usual fashion with 2-0 chromic suture.  Excellent hemostasis.

## 2022-09-12 NOTE — DISCHARGE NOTE OB - PLAN OF CARE
Regular diet.  Resume normal activity as tolerated. Follow up in the office in 6 weeks for a postpartum visit.  No heavy lifting, driving, or strenuous activity for 2 weeks.  Nothing per vagina such as tampons, intercourse, douches or tub baths for 6 weeks or until you see your doctor.  Call your doctor with any signs and symptoms of infection such as fever, chills, nausea or vomiting.  Call your doctor if you're unable to tolerate food, increase in vaginal bleeding or have difficulty urinating.  Call your doctor if you have pain that is not relieved by your prescribed medications.  Notify your doctor with any other concerns. Regular diet.  Resume normal activity as tolerated. Follow up in the office in 6 weeks for a postpartum visit.  No heavy lifting, driving, or strenuous activity for 2 weeks.  Nothing per vagina such as tampons, intercourse, douches or tub baths for 6 weeks or until you see your doctor.  Call your doctor with any signs and symptoms of infection such as fever, chills, nausea or vomiting.  Call your doctor if you're unable to tolerate food, increase in vaginal bleeding or have difficulty urinating.  Call your doctor if you have pain that is not relieved by your prescribed medications.  Notify your doctor with any other concerns.  -Notify OBGYN for blood pressures greater than 150/90, please keep blood pressure log for OBGYN to review.  -Please notifiy OBGYN for headaches, nausea, vomiting, visual disturbances, pain under the right breast  -Please take Procardia 30 XL as prescribed Regular diet.  Resume normal activity as tolerated. Follow up in the office in 6 weeks for a postpartum visit.  No heavy lifting, driving, or strenuous activity for 2 weeks.  Nothing per vagina such as tampons, intercourse, douches or tub baths for 6 weeks or until you see your doctor.  Call your doctor with any signs and symptoms of infection such as fever, chills, nausea or vomiting.  Call your doctor if you're unable to tolerate food, increase in vaginal bleeding or have difficulty urinating.  Call your doctor if you have pain that is not relieved by your prescribed medications.  Notify your doctor with any other concerns.  -Notify OBGYN for blood pressures greater than 150/90, please keep blood pressure log for OBGYN to review.  -Please notify OBGYN for headaches, nausea, vomiting, visual disturbances, pain under the right breast  -Please take Procardia 30 XL as prescribed continue procardia 30 xl, call for blood pressure more than 140/90, headache, abdominal pain, increased swelling, problems with your vision.  Return visit x 1 week for blood pressure check.

## 2022-09-12 NOTE — OB PROVIDER LABOR PROGRESS NOTE - ASSESSMENT
A/P: admitted in labor, sPEC    - stat pushing    ASCENCION Taylor, PGY-4  d/w Dr. Greenberg
Continue current management.    Dr. Greenberg in house.  Ana María Guillermo PGY1

## 2022-09-12 NOTE — OB RN PATIENT PROFILE - PAIN RATING/NUMBER SCALE (0-10): REST
Renal angiogram with left renal stent placement and temporary to tunneled HD catheter conversion completed by Dr Beverley Gitelman with anesthesia support  Pt transported back to room and bedside report given to Sonya Deal RN  Right groin dressing clean, dry and intact upon transfer  8

## 2022-09-12 NOTE — DISCHARGE NOTE OB - HOSPITAL COURSE
Presented in labor.  Uncomplicated .   Routine pp care.  Presented in labor.  Met criteria for pre-eclampsia with severe features.  Mg for seizure ppx until 24h postpartum.  Procardia for BP control.  Uncomplicated .   Routine pp care.

## 2022-09-12 NOTE — OB PROVIDER LABOR PROGRESS NOTE - NS_SUBJECTIVE/OBJECTIVE_OBGYN_ALL_OB_FT
Pt assessed to evaluate cervical change after complaining of increased rectal pressure
patient examined for ISE placement.  Reports rectal pressure

## 2022-09-12 NOTE — DISCHARGE NOTE OB - PATIENT PORTAL LINK FT
You can access the FollowMyHealth Patient Portal offered by [facility] by registering at the following website: [email or web address]. By joining [facility]’s FollowMyHealth portal, you will also be able to view your health information using other applications (apps) compatible with our system.

## 2022-09-12 NOTE — DISCHARGE NOTE OB - CARE PROVIDER_API CALL
Crystal Greenberg)  OBSGYN  Dept Director  [address], [address]  [place] [address]  Phone: [phone]  Fax: [phone]  Follow Up Time:

## 2022-09-12 NOTE — DISCHARGE NOTE OB - CARE PLAN
Principal Discharge DX:	Vaginal delivery  Assessment and plan of treatment:	Regular diet.  Resume normal activity as tolerated. Follow up in the office in 6 weeks for a postpartum visit.  No heavy lifting, driving, or strenuous activity for 2 weeks.  Nothing per vagina such as tampons, intercourse, douches or tub baths for 6 weeks or until you see your doctor.  Call your doctor with any signs and symptoms of infection such as fever, chills, nausea or vomiting.  Call your doctor if you're unable to tolerate food, increase in vaginal bleeding or have difficulty urinating.  Call your doctor if you have pain that is not relieved by your prescribed medications.  Notify your doctor with any other concerns.   1 Principal Discharge DX:	Vaginal delivery  Assessment and plan of treatment:	Regular diet.  Resume normal activity as tolerated. Follow up in the office in 6 weeks for a postpartum visit.  No heavy lifting, driving, or strenuous activity for 2 weeks.  Nothing per vagina such as tampons, intercourse, douches or tub baths for 6 weeks or until you see your doctor.  Call your doctor with any signs and symptoms of infection such as fever, chills, nausea or vomiting.  Call your doctor if you're unable to tolerate food, increase in vaginal bleeding or have difficulty urinating.  Call your doctor if you have pain that is not relieved by your prescribed medications.  Notify your doctor with any other concerns.  -Notify OBGYN for blood pressures greater than 150/90, please keep blood pressure log for OBGYN to review.  -Please notifiy OBGYN for headaches, nausea, vomiting, visual disturbances, pain under the right breast  -Please take Procardia 30 XL as prescribed   Principal Discharge DX:	Vaginal delivery  Assessment and plan of treatment:	Regular diet.  Resume normal activity as tolerated. Follow up in the office in 6 weeks for a postpartum visit.  No heavy lifting, driving, or strenuous activity for 2 weeks.  Nothing per vagina such as tampons, intercourse, douches or tub baths for 6 weeks or until you see your doctor.  Call your doctor with any signs and symptoms of infection such as fever, chills, nausea or vomiting.  Call your doctor if you're unable to tolerate food, increase in vaginal bleeding or have difficulty urinating.  Call your doctor if you have pain that is not relieved by your prescribed medications.  Notify your doctor with any other concerns.  -Notify OBGYN for blood pressures greater than 150/90, please keep blood pressure log for OBGYN to review.  -Please notify OBGYN for headaches, nausea, vomiting, visual disturbances, pain under the right breast  -Please take Procardia 30 XL as prescribed   Principal Discharge DX:	Vaginal delivery  Assessment and plan of treatment:	Regular diet.  Resume normal activity as tolerated. Follow up in the office in 6 weeks for a postpartum visit.  No heavy lifting, driving, or strenuous activity for 2 weeks.  Nothing per vagina such as tampons, intercourse, douches or tub baths for 6 weeks or until you see your doctor.  Call your doctor with any signs and symptoms of infection such as fever, chills, nausea or vomiting.  Call your doctor if you're unable to tolerate food, increase in vaginal bleeding or have difficulty urinating.  Call your doctor if you have pain that is not relieved by your prescribed medications.  Notify your doctor with any other concerns.  -Notify OBGYN for blood pressures greater than 150/90, please keep blood pressure log for OBGYN to review.  -Please notify OBGYN for headaches, nausea, vomiting, visual disturbances, pain under the right breast  -Please take Procardia 30 XL as prescribed  Secondary Diagnosis:	Severe preeclampsia, third trimester  Assessment and plan of treatment:	continue procardia 30 xl, call for blood pressure more than 140/90, headache, abdominal pain, increased swelling, problems with your vision.  Return visit x 1 week for blood pressure check.

## 2022-09-12 NOTE — OB RN DELIVERY SUMMARY - NSSELHIDDEN_OBGYN_ALL_OB_FT
[NS_DeliveryAttending1_OBGYN_ALL_OB_FT:[id number]=],[NS_DeliveryRN_OBGYN_ALL_OB_FT:[id number]=]

## 2022-09-12 NOTE — DISCHARGE NOTE OB - NS MD DC FALL RISK RISK
For information on Fall & Injury Prevention, visit: [email or web address] OR  [email or web address] OR  https://www.cdc.gov/steadi/patient.html

## 2022-09-12 NOTE — OB RN DELIVERY SUMMARY - NS_SEPSISRSKCALC_OBGYN_ALL_OB_FT
GBS status in the 'Prenatal Lab tests/results section' on the OB RN Patient Profile must be documented.   EOS calculated successfully. EOS Risk Factor: 0.5/1000 live births ([facility] national incidence); GA=39w2d; Temp=98.1; ROM=1.217; GBS='Negative'; Antibiotics='No antibiotics or any antibiotics < 2 hrs prior to birth'

## 2022-09-13 LAB — MAGNESIUM SERPL-MCNC: 4.4 MG/DL — HIGH (ref 1.6–2.6)

## 2022-09-13 RX ORDER — NIFEDIPINE 30 MG
1 TABLET, EXTENDED RELEASE 24 HR ORAL
Qty: 30 | Refills: 0
Start: 2022-09-13

## 2022-09-13 RX ADMIN — Medication 1 TABLET(S): at 11:56

## 2022-09-13 RX ADMIN — Medication 975 MILLIGRAM(S): at 04:00

## 2022-09-13 RX ADMIN — Medication 975 MILLIGRAM(S): at 02:58

## 2022-09-13 RX ADMIN — Medication 30 MILLIGRAM(S): at 02:54

## 2022-09-13 NOTE — LACTATION INITIAL EVALUATION - LACTATION INTERVENTIONS
initiate/review safe skin-to-skin/initiate/review hand expression/initiate/review finger suck/initiate/review breast massage/compression/reviewed components of an effective feeding and at least 8 effective feedings per day required/reviewed importance of monitoring infant diapers, the breastfeeding log, and minimum output each day/reviewed risks of unnecessary formula supplementation/reviewed risks of artificial nipples/reviewed benefits and recommendations for rooming in/reviewed feeding on demand/by cue at least 8 times a day/recommended follow-up with pediatrician within 24 hours of discharge

## 2022-09-13 NOTE — PROGRESS NOTE ADULT - SUBJECTIVE AND OBJECTIVE BOX
S: Patient doing well.   Pain controlled.  +OOB.  +void.  Tolerating PO.  Lochia WNL.    O: Vital Signs Last 24 Hrs  T(C): 36.7 (13 Sep 2022 04:25), Max: 37.4 (12 Sep 2022 10:36)  T(F): 98 (13 Sep 2022 04:25), Max: 99.3 (12 Sep 2022 10:36)  HR: 95 (13 Sep 2022 04:25) (93 - 129)  BP: 123/57 (13 Sep 2022 04:25) (116/57 - 144/81)  BP(mean): --  RR: 20 (13 Sep 2022 04:25) (15 - 20)  SpO2: 100% (13 Sep 2022 04:25) (98% - 100%)    Parameters below as of 13 Sep 2022 00:20  Patient On (Oxygen Delivery Method): room air        Gen: NAD  Abd: soft, NT, ND.   fundus firm below umbilicus, NT.  Ext: no tenderness B/L, negative Sophia's sign    Labs:                        11.8   11.94 )-----------( 306      ( 11 Sep 2022 23:45 )             37.9       ABO Interpretation: B ( @ 00:00)    Rh Interpretation: Positive ( @ 00:00)    Antibody Screen Negative            A: 26y PPD#1 s/p  complicated by pre-eclampsia with severe features, doing well.   -Now s/p Mg.  BP controlled on Procardia XL 30mg daily. No signs/symptoms of worsening pre-eclampsia.   -Continue routine postpartum care.  -Will reevaluate for discharge in AM 9/14 after BP monitoring off of Mg.     MD Amanda

## 2022-09-14 VITALS
SYSTOLIC BLOOD PRESSURE: 133 MMHG | DIASTOLIC BLOOD PRESSURE: 78 MMHG | HEART RATE: 104 BPM | RESPIRATION RATE: 18 BRPM | OXYGEN SATURATION: 98 % | TEMPERATURE: 98 F

## 2022-09-14 RX ORDER — NIFEDIPINE 30 MG
1 TABLET, EXTENDED RELEASE 24 HR ORAL
Qty: 30 | Refills: 0
Start: 2022-09-14 | End: 2022-10-13

## 2022-09-14 RX ADMIN — Medication 30 MILLIGRAM(S): at 03:09

## 2022-09-14 NOTE — PROGRESS NOTE ADULT - ATTENDING COMMENTS
Patient seen and agree with above.  BP stable and stable for discharge.  Continue procardia 30xl  RV x 1 week BP check  GALINDO Don

## 2022-09-14 NOTE — PROGRESS NOTE ADULT - ASSESSMENT
27y/o  PPD#2 from  c/b preeclampsia with severe features in stable condition.  S/p Magnesium with no s/s of PEC at this time.    #Postpartum state  - Continue with po analgesia  - Increase ambulation  - Continue regular diet    #PEC with severe features  - s/p Magnesium (-)  - c/w Procardia 30XL  - no s/s of PEC at this time  - BP wnl    CONNIE Mandel PGY1

## 2022-09-14 NOTE — PROGRESS NOTE ADULT - SUBJECTIVE AND OBJECTIVE BOX
R1 Progress Note    Patient seen and examined at bedside, no acute overnight events. No acute complaints.  Patient states she does not have pain and is not taking any pain medication at this time. Patient is ambulating, voiding spontaneously, passing gas, and tolerating regular diet. Denies CP, SOB, N/V, HA, blurred vision, epigastric pain.    Vital Signs Last 24 Hours  T(C): 36.7 (09-14-22 @ 05:44), Max: 37.6 (09-13-22 @ 14:21)  HR: 90 (09-14-22 @ 05:44) (90 - 100)  BP: 139/69 (09-14-22 @ 05:44) (119/64 - 139/69)  RR: 18 (09-14-22 @ 05:44) (17 - 19)  SpO2: 98% (09-14-22 @ 05:44) (97% - 100%)    Physical Exam:  General: NAD  Abdomen: Soft, non-tender, non-distended, fundus firm  Pelvic: Lochia wnl    Labs:    Blood Type: B Positive  Antibody Screen: Negative  RPR: Negative               11.8   11.94 )-----------( 306      ( 09-11 @ 23:45 )             37.9         MEDICATIONS  (STANDING):  acetaminophen     Tablet .. 975 milliGRAM(s) Oral <User Schedule>  diphtheria/tetanus/pertussis (acellular) Vaccine (ADAcel) 0.5 milliLiter(s) IntraMuscular once  ibuprofen  Tablet. 600 milliGRAM(s) Oral every 6 hours  NIFEdipine XL 30 milliGRAM(s) Oral daily  prenatal multivitamin 1 Tablet(s) Oral daily  sodium chloride 0.9% lock flush 3 milliLiter(s) IV Push every 8 hours    MEDICATIONS  (PRN):  benzocaine 20%/menthol 0.5% Spray 1 Spray(s) Topical every 6 hours PRN for Perineal discomfort  dibucaine 1% Ointment 1 Application(s) Topical every 6 hours PRN Perineal discomfort  diphenhydrAMINE 25 milliGRAM(s) Oral every 6 hours PRN Pruritus  hydrocortisone 1% Cream 1 Application(s) Topical every 6 hours PRN Moderate Pain (4-6)  lanolin Ointment 1 Application(s) Topical every 6 hours PRN nipple soreness  magnesium hydroxide Suspension 30 milliLiter(s) Oral two times a day PRN Constipation  oxyCODONE    IR 5 milliGRAM(s) Oral every 3 hours PRN Moderate to Severe Pain (4-10)  oxyCODONE    IR 5 milliGRAM(s) Oral once PRN Moderate to Severe Pain (4-10)  pramoxine 1%/zinc 5% Cream 1 Application(s) Topical every 4 hours PRN Moderate Pain (4-6)  simethicone 80 milliGRAM(s) Chew every 4 hours PRN Gas  witch hazel Pads 1 Application(s) Topical every 4 hours PRN Perineal discomfort

## 2022-09-15 ENCOUNTER — APPOINTMENT (OUTPATIENT)
Dept: ANTEPARTUM | Facility: CLINIC | Age: 27
End: 2022-09-15

## 2022-09-15 ENCOUNTER — NON-APPOINTMENT (OUTPATIENT)
Age: 27
End: 2022-09-15

## 2022-09-15 RX ORDER — NORETHINDRONE ACETATE AND ETHINYL ESTRADIOL AND FERROUS FUMARATE 1MG-20(21)
1-20 KIT ORAL
Qty: 84 | Refills: 3 | Status: DISCONTINUED | COMMUNITY
Start: 2019-09-17 | End: 2022-09-15

## 2022-09-15 RX ORDER — AZITHROMYCIN 500 MG/1
500 TABLET, FILM COATED ORAL
Qty: 2 | Refills: 0 | Status: DISCONTINUED | COMMUNITY
Start: 2019-09-20 | End: 2022-09-15

## 2022-09-17 ENCOUNTER — TRANSCRIPTION ENCOUNTER (OUTPATIENT)
Age: 27
End: 2022-09-17

## 2022-09-19 ENCOUNTER — NON-APPOINTMENT (OUTPATIENT)
Age: 27
End: 2022-09-19

## 2022-09-20 ENCOUNTER — NON-APPOINTMENT (OUTPATIENT)
Age: 27
End: 2022-09-20

## 2022-09-28 ENCOUNTER — NON-APPOINTMENT (OUTPATIENT)
Age: 27
End: 2022-09-28

## 2022-09-29 ENCOUNTER — NON-APPOINTMENT (OUTPATIENT)
Age: 27
End: 2022-09-29

## 2022-10-03 ENCOUNTER — NON-APPOINTMENT (OUTPATIENT)
Age: 27
End: 2022-10-03

## 2022-10-03 ENCOUNTER — TRANSCRIPTION ENCOUNTER (OUTPATIENT)
Age: 27
End: 2022-10-03

## 2022-10-12 ENCOUNTER — NON-APPOINTMENT (OUTPATIENT)
Age: 27
End: 2022-10-12

## 2022-10-13 ENCOUNTER — NON-APPOINTMENT (OUTPATIENT)
Age: 27
End: 2022-10-13

## 2022-10-17 ENCOUNTER — TRANSCRIPTION ENCOUNTER (OUTPATIENT)
Age: 27
End: 2022-10-17

## 2022-12-15 DIAGNOSIS — J06.9 ACUTE UPPER RESPIRATORY INFECTION, UNSPECIFIED: ICD-10-CM

## 2022-12-19 DIAGNOSIS — Z20.822 CONTACT WITH AND (SUSPECTED) EXPOSURE TO COVID-19: ICD-10-CM

## 2022-12-19 LAB — SARS-COV-2 N GENE NPH QL NAA+PROBE: NOT DETECTED

## 2022-12-20 LAB — SARS-COV-2 N GENE NPH QL NAA+PROBE: DETECTED

## 2023-03-09 PROBLEM — J45.909 UNSPECIFIED ASTHMA, UNCOMPLICATED: Chronic | Status: ACTIVE | Noted: 2022-09-11

## 2023-03-21 ENCOUNTER — LABORATORY RESULT (OUTPATIENT)
Age: 28
End: 2023-03-21

## 2023-03-21 ENCOUNTER — APPOINTMENT (OUTPATIENT)
Dept: CARDIOLOGY | Facility: CLINIC | Age: 28
End: 2023-03-21
Payer: COMMERCIAL

## 2023-03-21 ENCOUNTER — NON-APPOINTMENT (OUTPATIENT)
Age: 28
End: 2023-03-21

## 2023-03-21 VITALS
TEMPERATURE: 98.1 F | DIASTOLIC BLOOD PRESSURE: 80 MMHG | HEIGHT: 67 IN | BODY MASS INDEX: 42.53 KG/M2 | SYSTOLIC BLOOD PRESSURE: 130 MMHG | HEART RATE: 78 BPM | RESPIRATION RATE: 16 BRPM | OXYGEN SATURATION: 99 % | WEIGHT: 271 LBS

## 2023-03-21 VITALS — SYSTOLIC BLOOD PRESSURE: 130 MMHG | DIASTOLIC BLOOD PRESSURE: 82 MMHG

## 2023-03-21 DIAGNOSIS — U07.1 COVID-19: ICD-10-CM

## 2023-03-21 PROCEDURE — 99214 OFFICE O/P EST MOD 30 MIN: CPT | Mod: 25

## 2023-03-21 PROCEDURE — 93000 ELECTROCARDIOGRAM COMPLETE: CPT

## 2023-03-21 RX ORDER — NIRMATRELVIR AND RITONAVIR 300-100 MG
20 X 150 MG & KIT ORAL
Qty: 1 | Refills: 0 | Status: COMPLETED | COMMUNITY
Start: 2022-12-23 | End: 2023-03-21

## 2023-03-21 RX ORDER — NIFEDIPINE 30 MG/1
30 TABLET, EXTENDED RELEASE ORAL
Qty: 30 | Refills: 0 | Status: COMPLETED | COMMUNITY
Start: 2022-09-15 | End: 2023-03-21

## 2023-03-21 RX ORDER — IBUPROFEN 600 MG/1
600 TABLET, FILM COATED ORAL EVERY 6 HOURS
Qty: 56 | Refills: 0 | Status: COMPLETED | COMMUNITY
Start: 2022-09-15 | End: 2023-03-21

## 2023-03-21 RX ORDER — PRENATAL VIT NO.130/IRON/FOLIC 27MG-0.8MG
27-0.8 TABLET ORAL DAILY
Qty: 30 | Refills: 1 | Status: COMPLETED | COMMUNITY
Start: 2022-09-15 | End: 2023-03-21

## 2023-03-21 RX ORDER — ACETAMINOPHEN 325 MG/1
325 TABLET ORAL
Refills: 0 | Status: COMPLETED | COMMUNITY
Start: 2022-09-15 | End: 2023-03-21

## 2023-03-21 NOTE — PHYSICAL EXAM

## 2023-03-21 NOTE — DISCUSSION/SUMMARY
[FreeTextEntry1] : This is a 26-year-old female who comes in today complaining of sore throat, productive cough, and nasal congestion.  She denies shortness of breath, dizziness, palpitations.\par \par She has been on a Z-Rodri for the last 2 days.  Her throat is red without exudate.  She will continue on her Z-Rodri, increase her hydration, use Tylenol every 6-8 hours, and use Claritin-D as a decongestant for the next 5 to 6 days.\par She is instructed to follow-up with her primary care physician.

## 2023-03-21 NOTE — REVIEW OF SYSTEMS
[Negative] : Heme/Lymph [Feeling Fatigued] : feeling fatigued [Fever] : no fever [Chills] : no chills

## 2023-03-22 ENCOUNTER — APPOINTMENT (OUTPATIENT)
Dept: CARDIOLOGY | Facility: CLINIC | Age: 28
End: 2023-03-22
Payer: COMMERCIAL

## 2023-03-22 PROCEDURE — 93306 TTE W/DOPPLER COMPLETE: CPT

## 2023-03-29 ENCOUNTER — NON-APPOINTMENT (OUTPATIENT)
Age: 28
End: 2023-03-29

## 2023-03-31 ENCOUNTER — NON-APPOINTMENT (OUTPATIENT)
Age: 28
End: 2023-03-31

## 2023-04-20 ENCOUNTER — APPOINTMENT (OUTPATIENT)
Dept: INTERNAL MEDICINE | Facility: CLINIC | Age: 28
End: 2023-04-20
Payer: COMMERCIAL

## 2023-04-20 VITALS
DIASTOLIC BLOOD PRESSURE: 76 MMHG | WEIGHT: 270 LBS | HEART RATE: 93 BPM | BODY MASS INDEX: 42.38 KG/M2 | SYSTOLIC BLOOD PRESSURE: 126 MMHG | HEIGHT: 67 IN | TEMPERATURE: 97.8 F | OXYGEN SATURATION: 98 %

## 2023-04-20 DIAGNOSIS — O03.9 COMPLETE OR UNSPECIFIED SPONTANEOUS ABORTION W/OUT COMPLICATION: ICD-10-CM

## 2023-04-20 DIAGNOSIS — K59.00 CONSTIPATION, UNSPECIFIED: ICD-10-CM

## 2023-04-20 DIAGNOSIS — R22.1 LOCALIZED SWELLING, MASS AND LUMP, NECK: ICD-10-CM

## 2023-04-20 DIAGNOSIS — Z00.00 ENCOUNTER FOR GENERAL ADULT MEDICAL EXAMINATION W/OUT ABNORMAL FINDINGS: ICD-10-CM

## 2023-04-20 DIAGNOSIS — R21 RASH AND OTHER NONSPECIFIC SKIN ERUPTION: ICD-10-CM

## 2023-04-20 DIAGNOSIS — A74.9 CHLAMYDIAL INFECTION, UNSPECIFIED: ICD-10-CM

## 2023-04-20 DIAGNOSIS — Z78.9 OTHER SPECIFIED HEALTH STATUS: ICD-10-CM

## 2023-04-20 DIAGNOSIS — Z12.83 ENCOUNTER FOR SCREENING FOR MALIGNANT NEOPLASM OF SKIN: ICD-10-CM

## 2023-04-20 PROCEDURE — 99385 PREV VISIT NEW AGE 18-39: CPT | Mod: 25

## 2023-04-20 PROCEDURE — 36415 COLL VENOUS BLD VENIPUNCTURE: CPT

## 2023-04-20 PROCEDURE — G0447 BEHAVIOR COUNSEL OBESITY 15M: CPT

## 2023-04-22 DIAGNOSIS — E55.9 VITAMIN D DEFICIENCY, UNSPECIFIED: ICD-10-CM

## 2023-04-23 PROBLEM — Z78.9 DOES NOT USE ILLICIT DRUGS: Status: ACTIVE | Noted: 2023-04-20

## 2023-04-23 PROBLEM — R22.1 NECK FULLNESS: Status: ACTIVE | Noted: 2023-04-20

## 2023-04-23 NOTE — HISTORY OF PRESENT ILLNESS
[FreeTextEntry1] : Establish care  [de-identified] : Ms. RAMIREZ SIMS is a 27 year old woman with pmhx of class 3 obesity, hx of htn in pregnancy, prediabetes who presents to establish are. She cardiologist. Recent results discussed today during appointment. She has been using wegovy for weight loss, no side effects.Counseled on gradual increase for weight loss goal 5% in 3 months. She had tdap in 2022.

## 2023-04-23 NOTE — ASSESSMENT
[FreeTextEntry1] : 1) HM - Encouraged healthy meals and snacks, and  physical activity as tolerated for weight reduction/maintenance. Vaccine information in chart. . Lab ordered as below.  Orders and referral provided as below. Patient counseled on ABCDE's of skin cancer, dermatology referral provided today. . \par \par Patient is here for episodic care. All patient questions answered today and understood by patient. Henceforth, Patient to schedule follow up if new symptoms, questions, renewals or health concerns.

## 2023-04-23 NOTE — DATA REVIEWED
[FreeTextEntry1] : Reviewed recent notes, labs and imaging today. And updated patient's EMR. Discussed plan as below with patient

## 2023-04-23 NOTE — COUNSELING
[Fall prevention counseling provided] : Fall prevention counseling provided [Potential consequences of obesity discussed] : Potential consequences of obesity discussed [Benefits of weight loss discussed] : Benefits of weight loss discussed [Weigh Self Weekly] : weigh self weekly [Decrease Portions] : decrease portions [FreeTextEntry2] : \par Patient was explained that in rodents, GLP-1 analog caused dose-dependent and treatment-duration-dependent thyroid C-cell tumors at clinically relevant exposures. It is unknown whether these medications causes thyroid C-cell tumors, including medullary thyroid carcinoma (MTC), in humans as human relevance of GLP1 analog-induced rodent thyroid C-cell tumors has not been determined.  [FreeTextEntry4] : 15

## 2023-04-23 NOTE — PHYSICAL EXAM
[No Acute Distress] : no acute distress [Well Nourished] : well nourished [Well Developed] : well developed [Normal Sclera/Conjunctiva] : normal sclera/conjunctiva [PERRL] : pupils equal round and reactive to light [EOMI] : extraocular movements intact [Normal Outer Ear/Nose] : the outer ears and nose were normal in appearance [Normal Oropharynx] : the oropharynx was normal [Normal TMs] : both tympanic membranes were normal [No JVD] : no jugular venous distention [No Lymphadenopathy] : no lymphadenopathy [Supple] : supple [No Respiratory Distress] : no respiratory distress  [Clear to Auscultation] : lungs were clear to auscultation bilaterally [Normal Rate] : normal rate  [Regular Rhythm] : with a regular rhythm [Normal S1, S2] : normal S1 and S2 [No Murmur] : no murmur heard [Pedal Pulses Present] : the pedal pulses are present [No Edema] : there was no peripheral edema [No Extremity Clubbing/Cyanosis] : no extremity clubbing/cyanosis [Soft] : abdomen soft [Non Tender] : non-tender [Non-distended] : non-distended [Normal Bowel Sounds] : normal bowel sounds [Normal Posterior Cervical Nodes] : no posterior cervical lymphadenopathy [Normal Anterior Cervical Nodes] : no anterior cervical lymphadenopathy [No CVA Tenderness] : no CVA  tenderness [No Spinal Tenderness] : no spinal tenderness [No Joint Swelling] : no joint swelling [Grossly Normal Strength/Tone] : grossly normal strength/tone [No Rash] : no rash [Coordination Grossly Intact] : coordination grossly intact [No Focal Deficits] : no focal deficits [Normal Gait] : normal gait [Normal Affect] : the affect was normal [Normal Mood] : the mood was normal [Normal Insight/Judgement] : insight and judgment were intact [Comprehensive Foot Exam Normal] : Right and left foot were examined and both feet are normal. No ulcers in either foot. Toes are normal and with full ROM.  Normal tactile sensation with monofilament testing throughout both feet

## 2023-04-23 NOTE — HEALTH RISK ASSESSMENT
[Very Good] : ~his/her~  mood as very good [Yes] : Yes [Monthly or less (1 pt)] : Monthly or less (1 point) [1 or 2 (0 pts)] : 1 or 2 (0 points) [Never (0 pts)] : Never (0 points) [No] : In the past 12 months have you used drugs other than those required for medical reasons? No [No falls in past year] : Patient reported no falls in the past year [0] : 2) Feeling down, depressed, or hopeless: Not at all (0) [PHQ-2 Negative - No further assessment needed] : PHQ-2 Negative - No further assessment needed [None] : None [With Family] : lives with family [Feels Safe at Home] : Feels safe at home [Fully functional (bathing, dressing, toileting, transferring, walking, feeding)] : Fully functional (bathing, dressing, toileting, transferring, walking, feeding) [Fully functional (using the telephone, shopping, preparing meals, housekeeping, doing laundry, using] : Fully functional and needs no help or supervision to perform IADLs (using the telephone, shopping, preparing meals, housekeeping, doing laundry, using transportation, managing medications and managing finances) [Smoke Detector] : smoke detector [Seat Belt] :  uses seat belt [Patient/Caregiver not ready to engage] : , patient/caregiver not ready to engage [Never] : Never [de-identified] : walking [de-identified] : balanced [[id number]] : 0 [Reports changes in hearing] : Reports no changes in hearing [Reports changes in vision] : Reports no changes in vision

## 2023-04-25 LAB
25(OH)D3 SERPL-MCNC: 21.5 NG/ML
BASOPHILS # BLD AUTO: 0.04 K/UL
BASOPHILS NFR BLD AUTO: 0.6 %
EOSINOPHIL # BLD AUTO: 0.11 K/UL
EOSINOPHIL NFR BLD AUTO: 1.7 %
HCT VFR BLD CALC: 39.9 %
HGB BLD-MCNC: 12.3 G/DL
IMM GRANULOCYTES NFR BLD AUTO: 0.2 %
LYMPHOCYTES # BLD AUTO: 2.46 K/UL
LYMPHOCYTES NFR BLD AUTO: 38.1 %
MAN DIFF?: NORMAL
MCHC RBC-ENTMCNC: 26.9 PG
MCHC RBC-ENTMCNC: 30.8 GM/DL
MCV RBC AUTO: 87.1 FL
MONOCYTES # BLD AUTO: 0.49 K/UL
MONOCYTES NFR BLD AUTO: 7.6 %
NEUTROPHILS # BLD AUTO: 3.35 K/UL
NEUTROPHILS NFR BLD AUTO: 51.8 %
PLATELET # BLD AUTO: 325 K/UL
RBC # BLD: 4.58 M/UL
RBC # FLD: 14.5 %
VIT B12 SERPL-MCNC: 436 PG/ML
WBC # FLD AUTO: 6.46 K/UL

## 2023-05-19 ENCOUNTER — APPOINTMENT (OUTPATIENT)
Dept: CARDIOLOGY | Facility: CLINIC | Age: 28
End: 2023-05-19

## 2023-05-23 ENCOUNTER — NON-APPOINTMENT (OUTPATIENT)
Age: 28
End: 2023-05-23

## 2023-05-23 ENCOUNTER — APPOINTMENT (OUTPATIENT)
Dept: CARDIOLOGY | Facility: CLINIC | Age: 28
End: 2023-05-23
Payer: COMMERCIAL

## 2023-05-23 VITALS
BODY MASS INDEX: 40.81 KG/M2 | HEART RATE: 81 BPM | HEIGHT: 67 IN | WEIGHT: 260 LBS | SYSTOLIC BLOOD PRESSURE: 122 MMHG | DIASTOLIC BLOOD PRESSURE: 82 MMHG | OXYGEN SATURATION: 99 % | TEMPERATURE: 97.6 F | RESPIRATION RATE: 16 BRPM

## 2023-05-23 PROCEDURE — 99214 OFFICE O/P EST MOD 30 MIN: CPT | Mod: 25

## 2023-05-23 PROCEDURE — 93000 ELECTROCARDIOGRAM COMPLETE: CPT

## 2023-05-23 RX ORDER — SEMAGLUTIDE 1 MG/.5ML
1 INJECTION, SOLUTION SUBCUTANEOUS
Qty: 1 | Refills: 2 | Status: DISCONTINUED | COMMUNITY
Start: 2023-03-21 | End: 2023-05-23

## 2023-05-23 NOTE — ASSESSMENT
[FreeTextEntry1] : This is a 27 year year old female here today for follow up cardiac evaluation. \par She has a past medical history significant for hypertension in pregnancy and asthma.\par \par CHIEF COMPLAINT:\par Today she is feeling generally well and does not have any complaints at this time.  \par \par She may have occasional dyspnea on exertion when going up some steps.  She is currently on Wegovy at 1 mg and tolerating well.  She is down approximately 10 pounds since her last visit.\par \par -Her cardiac risk factors include her father with a history for diabetes, hyperlipidemia and hypertension.  Her mother is healthy. 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 She was told that Wegovy is contraindicated with this clinical history. Patient was counseled regarding symptoms of thyroid tumors (e.g., a mass in the neck, dysphagia, dyspnea, persistent hoarseness). Patient was also told that there is a risk of pancreatitis with use of Wegovy. She was told to be aware of persistent severe abdominal pain, sometimes radiating to the back with or without vomiting. If pancreatitis is suspected she is to stop the Wegovy and contact her PCP. If pancreatitis is confirmed she cannot restart the Wegovy. Patient was given the Wegovy patient information guide which provides additional information about the medication and its usage. Patient was informed that adherence to a diet and exercise program in addition to taking the Wegovy will optimize weight loss. She was also encouraged to drink 8 glasses of water daily. \par \par BLOOD PRESSURE:\par -BP is controlled in today's visit.\par \par BLOOD WORK:\par -New blood work was done 03/21/2023 which demonstrated normal lipid profile\par \par TESTING/REPORTS:\par -EKG done Mar 21, 2023 which demonstrated regular sinus rhythm with nonspecific ST-T wave changes, BPM of 78.\par \par -Echocardiogram done on March 22, 2023 demonstrated normal left ventricular systolic function ejection fraction 66%\par \par PLAN:\par -Patient will start Wegovy at 1.7 mg SQ injection weekly.\par -She will continue with her current medications and will contact the office if she is having any complaints between now and their next follow up appointment.\par -The patient is clinically stable from a cardiac standpoint on today's exam.\par \par I have discussed the plan of care with Ms. RAMIREZ SIMS  and she  will follow up in 3 months. She is compliant with all of her medications.\par \par The patient understands that aerobic exercises must be increased to at least 20 minutes 4 times/week along with maintaining lifestyle modifications including well-maintained diet. She will contact me at the office for any questions with their care or any changes in their health status.\par \par Pita ELAM

## 2023-05-23 NOTE — PHYSICAL EXAM

## 2023-05-24 RX ORDER — WHEAT DEXTRIN 3 G/4 G
POWDER (GRAM) ORAL
Qty: 1 | Refills: 1 | Status: COMPLETED | COMMUNITY
Start: 2023-04-20 | End: 2023-05-24

## 2023-09-05 VITALS — BODY MASS INDEX: 39.47 KG/M2 | WEIGHT: 252 LBS

## 2023-09-05 RX ORDER — SEMAGLUTIDE 1.7 MG/.75ML
1.7 INJECTION, SOLUTION SUBCUTANEOUS
Qty: 1 | Refills: 0 | Status: COMPLETED | COMMUNITY
Start: 2023-03-22 | End: 2023-09-05

## 2023-09-13 ENCOUNTER — LABORATORY RESULT (OUTPATIENT)
Age: 28
End: 2023-09-13

## 2023-09-18 VITALS — WEIGHT: 247 LBS | BODY MASS INDEX: 38.69 KG/M2

## 2023-09-25 ENCOUNTER — TRANSCRIPTION ENCOUNTER (OUTPATIENT)
Age: 28
End: 2023-09-25

## 2023-09-26 ENCOUNTER — NON-APPOINTMENT (OUTPATIENT)
Age: 28
End: 2023-09-26

## 2023-09-30 ENCOUNTER — TRANSCRIPTION ENCOUNTER (OUTPATIENT)
Age: 28
End: 2023-09-30

## 2023-12-05 ENCOUNTER — APPOINTMENT (OUTPATIENT)
Dept: CARDIOLOGY | Facility: CLINIC | Age: 28
End: 2023-12-05
Payer: COMMERCIAL

## 2023-12-05 VITALS
BODY MASS INDEX: 36.73 KG/M2 | RESPIRATION RATE: 16 BRPM | TEMPERATURE: 97.4 F | WEIGHT: 234 LBS | SYSTOLIC BLOOD PRESSURE: 118 MMHG | OXYGEN SATURATION: 100 % | HEART RATE: 70 BPM | DIASTOLIC BLOOD PRESSURE: 72 MMHG | HEIGHT: 67 IN

## 2023-12-05 DIAGNOSIS — R01.1 CARDIAC MURMUR, UNSPECIFIED: ICD-10-CM

## 2023-12-05 DIAGNOSIS — Z01.812 ENCOUNTER FOR PREPROCEDURAL LABORATORY EXAMINATION: ICD-10-CM

## 2023-12-05 PROCEDURE — 99214 OFFICE O/P EST MOD 30 MIN: CPT

## 2023-12-05 RX ORDER — MULTIVIT-MIN/FOLIC/VIT K/LYCOP 400-300MCG
50 MCG TABLET ORAL
Qty: 90 | Refills: 3 | Status: COMPLETED | COMMUNITY
Start: 2023-04-22 | End: 2023-12-05

## 2023-12-05 RX ORDER — NYSTATIN 100000 1/G
100000 POWDER TOPICAL
Qty: 1 | Refills: 0 | Status: COMPLETED | COMMUNITY
Start: 2023-04-20 | End: 2023-12-05

## 2024-01-22 LAB
INFLUENZA A RESULT: DETECTED
INFLUENZA B RESULT: NOT DETECTED
RESP SYN VIRUS RESULT: NOT DETECTED
SARS-COV-2 RESULT: NOT DETECTED

## 2024-03-14 RX ORDER — NORETHINDRONE ACETATE AND ETHINYL ESTRADIOL AND FERROUS FUMARATE 1MG-20(21)
1-20 KIT ORAL
Qty: 84 | Refills: 3 | Status: ACTIVE | COMMUNITY
Start: 2017-09-22 | End: 1900-01-01

## 2024-05-09 RX ORDER — SEMAGLUTIDE 2.4 MG/.75ML
2.4 INJECTION, SOLUTION SUBCUTANEOUS
Qty: 3 | Refills: 1 | Status: ACTIVE | COMMUNITY
Start: 2023-06-07

## 2024-06-20 ENCOUNTER — NON-APPOINTMENT (OUTPATIENT)
Age: 29
End: 2024-06-20

## 2024-06-20 DIAGNOSIS — R09.89 OTHER SPECIFIED SYMPTOMS AND SIGNS INVOLVING THE CIRCULATORY AND RESPIRATORY SYSTEMS: ICD-10-CM

## 2024-06-20 RX ORDER — AZITHROMYCIN 250 MG/1
250 TABLET, FILM COATED ORAL
Qty: 1 | Refills: 2 | Status: ACTIVE | COMMUNITY
Start: 2024-06-20 | End: 1900-01-01

## 2024-06-25 ENCOUNTER — APPOINTMENT (OUTPATIENT)
Dept: ULTRASOUND IMAGING | Facility: CLINIC | Age: 29
End: 2024-06-25
Payer: COMMERCIAL

## 2024-06-25 PROCEDURE — 76830 TRANSVAGINAL US NON-OB: CPT

## 2024-06-25 PROCEDURE — 76700 US EXAM ABDOM COMPLETE: CPT

## 2024-06-25 PROCEDURE — 76856 US EXAM PELVIC COMPLETE: CPT

## 2024-07-18 ENCOUNTER — LABORATORY RESULT (OUTPATIENT)
Age: 29
End: 2024-07-18

## 2024-07-19 ENCOUNTER — LABORATORY RESULT (OUTPATIENT)
Age: 29
End: 2024-07-19

## 2024-08-08 NOTE — OB PROVIDER LABOR PROGRESS NOTE - NS_OBIHIFHRDETAILS_OBGYN_ALL_OB_FT
135, mod, + accels, - decels
Anesthesia Post-op Note    Patient: Jacquelyn Rodriguez  Procedure(s) Performed: EXTRACTION, CATARACT, WITH IOL INSERTION RIGHT (Right: Eye)   Anesthesia type: MAC    Vitals Value Taken Time   Temp 36.2 °C (97.2 °F) 08/08/24 1300   Pulse 68 08/08/24 1315   Resp 16 08/08/24 1315   SpO2 99 % 08/08/24 1315   /73 08/08/24 1315         Post-op Vital Signs:stable  Level of Consciousness: participates in exam, awake, answers questions appropriately, alert and oriented  Respiratory Status: spontaneous ventilation  Cardiovascular stable  Hydration: euvolemic  Pain Management: adequately controlled  Handoff: Handoff to receiving nurse was performed and questions were answered  Nausea: None  Airway Patency:patent  Post-op Assessment: awake, alert, appropriately conversant, or baseline, no complications, patient tolerated procedure well and no evidence of recall      No notable events documented.                      
baseline 145  minimal variability  +accels  - decels

## 2024-10-28 RX ORDER — GUAIFENESIN AND DEXTROMETHORPHAN HBR 20; 400 MG/1; MG/1
20-400 TABLET ORAL EVERY 4 HOURS
Qty: 15 | Refills: 0 | Status: ACTIVE | COMMUNITY
Start: 2024-10-28 | End: 1900-01-01

## 2024-11-01 DIAGNOSIS — R05.9 COUGH, UNSPECIFIED: ICD-10-CM

## 2024-11-01 DIAGNOSIS — R09.89 OTHER SPECIFIED SYMPTOMS AND SIGNS INVOLVING THE CIRCULATORY AND RESPIRATORY SYSTEMS: ICD-10-CM

## 2024-11-01 RX ORDER — ALBUTEROL SULFATE 2.5 MG/3ML
(2.5 MG/3ML) SOLUTION RESPIRATORY (INHALATION) EVERY 6 HOURS
Qty: 1 | Refills: 2 | Status: ACTIVE | COMMUNITY
Start: 2024-11-01 | End: 1900-01-01

## 2024-11-29 ENCOUNTER — OUTPATIENT (OUTPATIENT)
Dept: OUTPATIENT SERVICES | Facility: HOSPITAL | Age: 29
LOS: 1 days | End: 2024-11-29
Payer: COMMERCIAL

## 2024-11-29 ENCOUNTER — APPOINTMENT (OUTPATIENT)
Dept: MRI IMAGING | Facility: HOSPITAL | Age: 29
End: 2024-11-29

## 2024-11-29 DIAGNOSIS — R10.2 PELVIC AND PERINEAL PAIN: ICD-10-CM

## 2024-11-29 DIAGNOSIS — N83.291 OTHER OVARIAN CYST, RIGHT SIDE: ICD-10-CM

## 2024-11-29 PROCEDURE — 72197 MRI PELVIS W/O & W/DYE: CPT

## 2024-11-29 PROCEDURE — A9579: CPT

## 2025-01-16 ENCOUNTER — APPOINTMENT (OUTPATIENT)
Dept: OBGYN | Facility: CLINIC | Age: 30
End: 2025-01-16
Payer: COMMERCIAL

## 2025-01-16 VITALS
HEIGHT: 67 IN | BODY MASS INDEX: 36.41 KG/M2 | SYSTOLIC BLOOD PRESSURE: 121 MMHG | DIASTOLIC BLOOD PRESSURE: 80 MMHG | WEIGHT: 232 LBS | HEART RATE: 106 BPM

## 2025-01-16 DIAGNOSIS — N83.201 UNSPECIFIED OVARIAN CYST, RIGHT SIDE: ICD-10-CM

## 2025-01-16 DIAGNOSIS — N92.1 EXCESSIVE AND FREQUENT MENSTRUATION WITH IRREGULAR CYCLE: ICD-10-CM

## 2025-01-16 DIAGNOSIS — N83.202 UNSPECIFIED OVARIAN CYST, RIGHT SIDE: ICD-10-CM

## 2025-01-16 DIAGNOSIS — I10 ESSENTIAL (PRIMARY) HYPERTENSION: ICD-10-CM

## 2025-01-16 DIAGNOSIS — N92.0 EXCESSIVE AND FREQUENT MENSTRUATION WITH REGULAR CYCLE: ICD-10-CM

## 2025-01-16 PROCEDURE — 99204 OFFICE O/P NEW MOD 45 MIN: CPT

## 2025-01-16 PROCEDURE — 99459 PELVIC EXAMINATION: CPT

## 2025-01-18 LAB
CANCER AG125 SERPL-ACNC: 12 U/ML
CANCER AG19-9 SERPL-ACNC: 10 U/ML
CEA SERPL-MCNC: 2.8 NG/ML
HE4X: 26.4 PMOL/L

## 2025-01-30 ENCOUNTER — TRANSCRIPTION ENCOUNTER (OUTPATIENT)
Age: 30
End: 2025-01-30

## 2025-02-09 ENCOUNTER — EMERGENCY (EMERGENCY)
Facility: HOSPITAL | Age: 30
LOS: 1 days | Discharge: HOME | End: 2025-02-09
Attending: EMERGENCY MEDICINE
Payer: COMMERCIAL

## 2025-02-09 VITALS
DIASTOLIC BLOOD PRESSURE: 89 MMHG | RESPIRATION RATE: 17 BRPM | OXYGEN SATURATION: 99 % | SYSTOLIC BLOOD PRESSURE: 123 MMHG | TEMPERATURE: 98 F | HEART RATE: 99 BPM

## 2025-02-09 LAB
ALBUMIN SERPL ELPH-MCNC: 4.6 G/DL — SIGNIFICANT CHANGE UP (ref 3.3–5)
ALP SERPL-CCNC: 48 U/L — SIGNIFICANT CHANGE UP (ref 40–120)
ALT FLD-CCNC: 12 U/L — SIGNIFICANT CHANGE UP (ref 10–45)
ANION GAP SERPL CALC-SCNC: 18 MMOL/L — HIGH (ref 5–17)
AST SERPL-CCNC: 10 U/L — SIGNIFICANT CHANGE UP (ref 10–40)
BASOPHILS # BLD AUTO: 0.02 K/UL — SIGNIFICANT CHANGE UP (ref 0–0.2)
BASOPHILS NFR BLD AUTO: 0.2 % — SIGNIFICANT CHANGE UP (ref 0–2)
BILIRUB SERPL-MCNC: 0.6 MG/DL — SIGNIFICANT CHANGE UP (ref 0.2–1.2)
BLD GP AB SCN SERPL QL: NEGATIVE — SIGNIFICANT CHANGE UP
BUN SERPL-MCNC: 9 MG/DL — SIGNIFICANT CHANGE UP (ref 7–23)
CALCIUM SERPL-MCNC: 10.2 MG/DL — SIGNIFICANT CHANGE UP (ref 8.4–10.5)
CHLORIDE SERPL-SCNC: 104 MMOL/L — SIGNIFICANT CHANGE UP (ref 96–108)
CO2 SERPL-SCNC: 19 MMOL/L — LOW (ref 22–31)
CREAT SERPL-MCNC: 0.9 MG/DL — SIGNIFICANT CHANGE UP (ref 0.5–1.3)
EGFR: 88 ML/MIN/1.73M2 — SIGNIFICANT CHANGE UP
EOSINOPHIL # BLD AUTO: 0 K/UL — SIGNIFICANT CHANGE UP (ref 0–0.5)
EOSINOPHIL NFR BLD AUTO: 0 % — SIGNIFICANT CHANGE UP (ref 0–6)
FLUAV AG NPH QL: SIGNIFICANT CHANGE UP
FLUBV AG NPH QL: SIGNIFICANT CHANGE UP
GLUCOSE SERPL-MCNC: 139 MG/DL — HIGH (ref 70–99)
HCG SERPL-ACNC: 333.4 MIU/ML — HIGH
HCT VFR BLD CALC: 43.9 % — SIGNIFICANT CHANGE UP (ref 34.5–45)
HGB BLD-MCNC: 14.2 G/DL — SIGNIFICANT CHANGE UP (ref 11.5–15.5)
IMM GRANULOCYTES NFR BLD AUTO: 0.3 % — SIGNIFICANT CHANGE UP (ref 0–0.9)
LIDOCAIN IGE QN: 37 U/L — SIGNIFICANT CHANGE UP (ref 7–60)
LYMPHOCYTES # BLD AUTO: 1.06 K/UL — SIGNIFICANT CHANGE UP (ref 1–3.3)
LYMPHOCYTES # BLD AUTO: 12.3 % — LOW (ref 13–44)
MAGNESIUM SERPL-MCNC: 2 MG/DL — SIGNIFICANT CHANGE UP (ref 1.6–2.6)
MCHC RBC-ENTMCNC: 27.8 PG — SIGNIFICANT CHANGE UP (ref 27–34)
MCHC RBC-ENTMCNC: 32.3 G/DL — SIGNIFICANT CHANGE UP (ref 32–36)
MCV RBC AUTO: 85.9 FL — SIGNIFICANT CHANGE UP (ref 80–100)
MONOCYTES # BLD AUTO: 0.28 K/UL — SIGNIFICANT CHANGE UP (ref 0–0.9)
MONOCYTES NFR BLD AUTO: 3.2 % — SIGNIFICANT CHANGE UP (ref 2–14)
NEUTROPHILS # BLD AUTO: 7.23 K/UL — SIGNIFICANT CHANGE UP (ref 1.8–7.4)
NEUTROPHILS NFR BLD AUTO: 84 % — HIGH (ref 43–77)
NRBC # BLD: 0 /100 WBCS — SIGNIFICANT CHANGE UP (ref 0–0)
NRBC BLD-RTO: 0 /100 WBCS — SIGNIFICANT CHANGE UP (ref 0–0)
PLATELET # BLD AUTO: 372 K/UL — SIGNIFICANT CHANGE UP (ref 150–400)
POTASSIUM SERPL-MCNC: 4.1 MMOL/L — SIGNIFICANT CHANGE UP (ref 3.5–5.3)
POTASSIUM SERPL-SCNC: 4.1 MMOL/L — SIGNIFICANT CHANGE UP (ref 3.5–5.3)
PROT SERPL-MCNC: 8.2 G/DL — SIGNIFICANT CHANGE UP (ref 6–8.3)
RBC # BLD: 5.11 M/UL — SIGNIFICANT CHANGE UP (ref 3.8–5.2)
RBC # FLD: 13.5 % — SIGNIFICANT CHANGE UP (ref 10.3–14.5)
RH IG SCN BLD-IMP: POSITIVE — SIGNIFICANT CHANGE UP
RSV RNA NPH QL NAA+NON-PROBE: SIGNIFICANT CHANGE UP
SARS-COV-2 RNA SPEC QL NAA+PROBE: SIGNIFICANT CHANGE UP
SODIUM SERPL-SCNC: 141 MMOL/L — SIGNIFICANT CHANGE UP (ref 135–145)
WBC # BLD: 8.62 K/UL — SIGNIFICANT CHANGE UP (ref 3.8–10.5)
WBC # FLD AUTO: 8.62 K/UL — SIGNIFICANT CHANGE UP (ref 3.8–10.5)

## 2025-02-09 PROCEDURE — 93975 VASCULAR STUDY: CPT | Mod: 26

## 2025-02-09 PROCEDURE — 76856 US EXAM PELVIC COMPLETE: CPT | Mod: 26,59

## 2025-02-09 PROCEDURE — 99284 EMERGENCY DEPT VISIT MOD MDM: CPT

## 2025-02-09 PROCEDURE — 76830 TRANSVAGINAL US NON-OB: CPT | Mod: 26

## 2025-02-09 RX ORDER — BACTERIOSTATIC SODIUM CHLORIDE 0.9 %
1000 VIAL (ML) INJECTION ONCE
Refills: 0 | Status: COMPLETED | OUTPATIENT
Start: 2025-02-09 | End: 2025-02-09

## 2025-02-09 RX ORDER — METOCLOPRAMIDE 10 MG/1
10 TABLET ORAL ONCE
Refills: 0 | Status: COMPLETED | OUTPATIENT
Start: 2025-02-09 | End: 2025-02-09

## 2025-02-09 RX ORDER — ONDANSETRON 4 MG/1
4 TABLET, ORALLY DISINTEGRATING ORAL ONCE
Refills: 0 | Status: COMPLETED | OUTPATIENT
Start: 2025-02-09 | End: 2025-02-09

## 2025-02-09 RX ORDER — SODIUM CHLORIDE 9 G/ML
1000 INJECTION, SOLUTION INTRAVENOUS
Refills: 0 | Status: ACTIVE | OUTPATIENT
Start: 2025-02-09 | End: 2026-01-08

## 2025-02-09 RX ORDER — ACETAMINOPHEN 160 MG/5ML
1000 SUSPENSION ORAL ONCE
Refills: 0 | Status: COMPLETED | OUTPATIENT
Start: 2025-02-09 | End: 2025-02-09

## 2025-02-09 RX ADMIN — Medication 1000 MILLILITER(S): at 18:35

## 2025-02-09 RX ADMIN — ONDANSETRON 4 MILLIGRAM(S): 4 TABLET, ORALLY DISINTEGRATING ORAL at 18:34

## 2025-02-09 RX ADMIN — Medication 1000 MILLILITER(S): at 21:50

## 2025-02-09 RX ADMIN — ONDANSETRON 4 MILLIGRAM(S): 4 TABLET, ORALLY DISINTEGRATING ORAL at 22:17

## 2025-02-09 RX ADMIN — ACETAMINOPHEN 400 MILLIGRAM(S): 160 SUSPENSION ORAL at 21:56

## 2025-02-09 NOTE — ED ADULT NURSE REASSESSMENT NOTE - NS ED NURSE REASSESS COMMENT FT1
Report received from Negro DRAKE. Patient returned from US at this time. Pt A&Ox4, in no acute distress at time. Family remains at bedside. Patient safety maintained, bed is in lowest position, wheels locked, and side rails raised.

## 2025-02-09 NOTE — ED ADULT NURSE NOTE - ED STAT RN HANDOFF DETAILS
Report endorsed to oncoming Dominga RN. Safety checks completed this shift. Safety rounds completed hourly.  IV sites checked Q2+remains WDL. Medications administered as ordered with no signs/symptoms of adverse reactions. Fall & skin precautions in place. Any issues endorsed to oncoming RN for follow up.

## 2025-02-09 NOTE — ED PROVIDER NOTE - OBJECTIVE STATEMENT
29-year-old female , with no significant past medical history, presenting to the emergency room with nausea and vomiting since yesterday.  Emesis described as bilious, nonbloody.  Patient took a home pregnancy test yesterday, was found to be pregnant.  Patient's LMP was 2025.  Denies associated fever, chills, diarrhea.  Patient does report abdominal pain on the right side, that she says is chronic secondary to her cyst that she was undergoing evaluation for possible surgery.  Denies recent sick contacts, recent travel.

## 2025-02-09 NOTE — ED ADULT NURSE NOTE - OBJECTIVE STATEMENT
pt presents to the ED A&ox4 co nausea and vomiting x 230am. Hx , ovarian cyst. Pt states she recently found out that she is positive for pregnancy yesterday. Pt also endorses R side abdominal pain, in which she states stems from an ovarian cyst. Emesis is nonbloody and bilitious-occurs almost every hour. Denies f.c,nvd, chest pain, vaginal bleeding.

## 2025-02-09 NOTE — CONSULT NOTE ADULT - SUBJECTIVE AND OBJECTIVE BOX
HPI: 29y G_P_  Last Menstrual Period presents with       PMHX;  PSHX;  POBHX;  PGYNHX:  SOCIAL:  Allergies: NKDA    MEDS:  dextrose 5% + lactated ringers. 1000 milliLiter(s) IV Continuous <Continuous>  metoclopramide Injectable 10 milliGRAM(s) IV Push once      Vital Signs Last 24 Hrs  T(C): 37.1 (09 Feb 2025 21:03), Max: 37.1 (09 Feb 2025 21:03)  T(F): 98.8 (09 Feb 2025 21:03), Max: 98.8 (09 Feb 2025 21:03)  HR: 84 (09 Feb 2025 21:03) (84 - 99)  BP: 141/83 (09 Feb 2025 21:03) (123/89 - 141/83)  RR: 16 (09 Feb 2025 21:03) (16 - 17)  SpO2: 97% (09 Feb 2025 21:03) (97% - 99%)    Parameters below as of 09 Feb 2025 21:03  Patient On (Oxygen Delivery Method): room air         PHYSICAL EXAM:  CHEST/LUNG: Clear to percussion bilaterally; No rales, rhonchi, wheezing, or rubs  HEART: Regular rate and rhythm; No murmurs, rubs, or gallops  ABDOMEN: Soft, Nontender, Nondistended; Bowel sounds present  EXTREMITIES:  2+ Peripheral Pulses, No clubbing, cyanosis, or edema  PELVIC:        EXTERNAL GENITALIA: Normal. No rashes or lesions noted.         VAGINA: healthy pink mucosa, no gross lesions, no discharge noted, no blood noted.          CERVIX: no lesions. closed, no active bleeding through os. no CMt noted.        UTERUS: normal size, nontender, mobile        ADNEXA: no adnexal masses or tenderness appreciated.    LABS:                        14.2   8.62  )-----------( 372      ( 09 Feb 2025 18:46 )             43.9     02-09    141  |  104  |  9   ----------------------------<  139[H]  4.1   |  19[L]  |  0.90    Ca    10.2      09 Feb 2025 18:46  Mg     2.0     02-09    TPro  8.2  /  Alb  4.6  /  TBili  0.6  /  DBili  x   /  AST  10  /  ALT  12  /  AlkPhos  48  02-09    Urinalysis Basic - ( 09 Feb 2025 18:46 )    Color: x / Appearance: x / SG: x / pH: x  Gluc: 139 mg/dL / Ketone: x  / Bili: x / Urobili: x   Blood: x / Protein: x / Nitrite: x   Leuk Esterase: x / RBC: x / WBC x   Sq Epi: x / Non Sq Epi: x / Bacteria: x          RADIOLOGY STUDIES:  < from: US Doppler Pelvis (02.09.25 @ 20:40) >    ACC: [id number] EXAM:  US DPLX PELVIC   ORDERED BY:  HARIS BARNETT     ACC: [id number] EXAM:  US PELVIC COMPLETE   ORDERED BY:  HARIS BARNETT     ACC: [id number] EXAM:  US TRANSVAGINAL   ORDERED BY:  HARIS BARNETT     PROCEDURE DATE:  02/09/2025          INTERPRETATION:  CLINICAL INFORMATION: Nausea and vomiting, positive   pregnancy test    LMP: 01/14/2025    COMPARISON: Pelvic ultrasound 6/25/2024 and MR pelvis 11/29/2024    TECHNIQUE:  Endovaginal and transabdominal pelvic sonogram. Color and Spectral   Doppler wasperformed.    FINDINGS:  Uterus: 8.6 cm x 4 cm x 4.9 cm. No intrauterine sac.  Endometrium: 1.4 cm. Within normal limits.    Right ovary: 3.5 cm x 2.5 cm x 2.7 cm. Hyperechoic solid lesion, measures   2.9 x 2.6 x 2.3 cm, previously described as dermoid. Corpus luteum cyst   measures 1.9 x 1.6 x 1.7 cm. Normal arterial and venous waveforms.    Left ovary: The left ovary again extends medially along the posterior   aspect of the uterus. The technologist reports that it was difficult to   get exact measurements. The ovarian parenchyma measures. 2.9 x 2.4 x 2.2   cm. Complex cyst with nodular septation and internal vascularity,   measures 7.9 x 6.5 x 8.5 cm, essentially unchanged. Normal arterial and   venous waveforms.    Fluid: Small volume fluid within the cul-de-sac.    IMPRESSION:  No intrauterine gestation. Serial trending beta-hCG values along with   short-term interval obstetrical ultrasound is recommended for follow-up.    Complex left ovarian cystic structure again noted, which may represent   neoplasm. GYN consult recommended.    Right ovarian dermoid.    --- End of Report ---           AZUL GUTIÉRREZ DO; Resident Radiologist  This document has been electronically signed.  CEDRIC STACK MD; Attending Radiologist  This document has been electronically signed. Feb 9 2025  9:42PM    < end of copied text >      A/P:     d/w  _______    Isabela Brooks MD, PGY3 HPI: 30 y/o  Last Menstrual Period: 25 @3w5d by LMP presents to the ED with epigastric pain, nausea and vomiting since yesterday.  Patient recently had a positive pregnancy test this weekend. Patient states that she vomited every hour since 2:30am this morning and began feeling very week this afternoon with no relief of her nausea with common regiments. Emesis described as bilious, though nonbloody. Denies associated fever, chills, diarrhea.  Patient does report abdominal pain on the left side, that she says is chronic secondary to her cyst that she was undergoing evaluation for possible surgery with Dr. Wise.  Denies recent sick contacts, recent travel.    PMHX; Asthma, Eczema   PSHX; D+C  POBHX;  , 1 SAB, 1 TOP w/ D+C  PGYNHX: 8cm L complex ovarian cyst, 2cm R ovarian cyst  SOCIAL: drinks alcohol socially denies t/d use; denies hx of Anxiety or Depression  Allergies: NKDA  MEDS: OCPs, Wegovy (to be d/c'd in pregnancy)    MEDICATIONS  (STANDING):  dextrose 5% + lactated ringers. 1000 milliLiter(s) (75 mL/Hr) IV Continuous <Continuous>      Vital Signs Last 24 Hrs  T(C): 37.1 (2025 21:03), Max: 37.1 (2025 21:03)  T(F): 98.8 (2025 21:03), Max: 98.8 (2025 21:03)  HR: 84 (2025 21:03) (84 - 99)  BP: 141/83 (2025 21:03) (123/89 - 141/83)  RR: 16 (2025 21:03) (16 - 17)  SpO2: 97% (2025 21:03) (97% - 99%)    Parameters below as of 2025 21:03  Patient On (Oxygen Delivery Method): room air      PHYSICAL EXAM:  CHEST/LUNG: No increased work of breathing sitting in an upright position on the stretcher  HEART: Regular rate and rhythm;  ABDOMEN: Soft, Nontender, Nondistended;  benign abdominal exam   EXTREMITIES:  2+ Peripheral Pulses, No clubbing, cyanosis, or edema  PELVIC: deferred given denial of vaginal bleeding    LABS:                        14.2   8.62  )-----------( 372      ( 2025 18:46 )             43.9         141  |  104  |  9   ----------------------------<  139[H]  4.1   |  19[L]  |  0.90    Ca    10.2      2025 18:46  Mg     2.0         TPro  8.2  /  Alb  4.6  /  TBili  0.6  /  DBili  x   /  AST  10  /  ALT  12  /  AlkPhos  48      Urinalysis Basic - ( 2025 18:46 )    Color: x / Appearance: x / SG: x / pH: x  Gluc: 139 mg/dL / Ketone: x  / Bili: x / Urobili: x   Blood: x / Protein: x / Nitrite: x   Leuk Esterase: x / RBC: x / WBC x   Sq Epi: x / Non Sq Epi: x / Bacteria: x        RADIOLOGY STUDIES:  < from: US Doppler Pelvis (25 @ 20:40) >    ACC: [id number] EXAM:  US DPLX PELVIC   ORDERED BY:  HARIS BARNETT     ACC: [id number] EXAM:  US PELVIC COMPLETE   ORDERED BY:  HARIS BARNETT     ACC: [id number] EXAM:  US TRANSVAGINAL   ORDERED BY:  HARIS BARNETT     PROCEDURE DATE:  2025          INTERPRETATION:  CLINICAL INFORMATION: Nausea and vomiting, positive   pregnancy test    LMP: 2025    COMPARISON: Pelvic ultrasound 2024 and MR pelvis 2024    TECHNIQUE:  Endovaginal and transabdominal pelvic sonogram. Color and Spectral   Doppler was performed.    FINDINGS:  Uterus: 8.6 cm x 4 cm x 4.9 cm. No intrauterine sac.  Endometrium: 1.4 cm. Within normal limits.    Right ovary: 3.5 cm x 2.5 cm x 2.7 cm. Hyperechoic solid lesion, measures   2.9 x 2.6 x 2.3 cm, previously described as dermoid. Corpus luteum cyst   measures 1.9 x 1.6 x 1.7 cm. Normal arterial and venous waveforms.    Left ovary: The left ovary again extends medially along the posterior   aspect of the uterus. The technologist reports that it was difficult to   get exact measurements. The ovarian parenchyma measures. 2.9 x 2.4 x 2.2   cm. Complex cyst with nodular septation and internal vascularity,   measures 7.9 x 6.5 x 8.5 cm, essentially unchanged. Normal arterial and   venous waveforms.    Fluid: Small volume fluid within the cul-de-sac.    IMPRESSION:  No intrauterine gestation. Serial trending beta-hCG values along with   short-term interval obstetrical ultrasound is recommended for follow-up.    Complex left ovarian cystic structure again noted, which may represent   neoplasm. GYN consult recommended.    Right ovarian dermoid.    --- End of Report ---           AZUL GUTIÉRREZ DO; Resident Radiologist  This document has been electronically signed.  CEDRIC STACK MD; Attending Radiologist  This document has been electronically signed. 2025  9:42PM    < end of copied text >

## 2025-02-09 NOTE — ED ADULT NURSE NOTE - ISOLATION TYPE:
None Myalgia Treatment: I explained this is common when taking isotretinoin. If this worsens they will contact us. They may try OTC ibuprofen.

## 2025-02-09 NOTE — ED PROVIDER NOTE - NSFOLLOWUPINSTRUCTIONS_ED_ALL_ED_FT
You were seen in the Emergency Department for nausea and vomiting in pregnancy.  You had an ultrasound done in the department which could not confirm an intrauterine pregnancy.  You must follow up outpatient with your OB-Dr. Ly for repeat blood tests and ultrasound.  We have sent medications to your pharmacy to help with your symptoms.     1) Continue all previously prescribed medications as directed.    2) Follow up with your primary care physician - take copies of your results.    3) Return to the Emergency Department for worsening or persistent symptoms, and/or ANY NEW OR CONCERNING SYMPTOMS.

## 2025-02-09 NOTE — ED PROVIDER NOTE - CLINICAL SUMMARY MEDICAL DECISION MAKING FREE TEXT BOX
29-year-old female , with no significant past medical history, presenting to the emergency room with nausea and vomiting since yesterday.  Vitals nonactionable.  Physical exam with heart regular rate and rhythm, lungs clear to station, abdomen soft lungs and nontender.  Concern for viral gastro, pancreatitis, ectopic pregnancy.  Will get labs including CBC, CMP, type and screen, lipase, hCG, UA/UC, flu with COVID, transvaginal ultrasound.  Will administer fluids and Zofran for symptomatic improvement. 29-year-old female , with no significant past medical history, presenting to the emergency room with nausea and vomiting since yesterday.  Vitals nonactionable.  Physical exam with heart regular rate and rhythm, lungs clear to station, abdomen soft lungs and nontender.  Concern for viral gastro, pancreatitis, ectopic pregnancy.  Will get labs including CBC, CMP, type and screen, lipase, hCG, UA/UC, flu with COVID, transvaginal ultrasound.  Will administer fluids and Zofran for symptomatic improvement.  Attending Karly Nunez: 29-year-old female G3, P1 who recently found out she was pregnant presenting with concern for nausea, vomiting and not feeling well.  Patient denies any recent URI symptoms.  Upon arrival patient is awake and alert following commands.  Patient denies any vaginal bleeding or vaginal cramping.  On exam abdomen is soft and nontender.  No right lower quadrant tenderness to palpation making appendicitis less likely.  No reported sick contacts.  Patient denies any diarrhea.  Concern for possible hyperemesis versus viral infection.  Patient is passing gas making obstruction less likely.  Patient does have a known history of an ovarian cyst and she has been following with GYN.  No prior ultrasounds for this pregnancy. No head trauma and patient is awake alert following commands and nonfocal neurologic exam making intracranial cause of significant nausea vomiting less likely. Will obtain labs, hydration, antiemetic, transvaginal ultrasound and reevaluate.  Will perform serial abdominal exams and monitor for any abdominal discomfort.

## 2025-02-09 NOTE — ED PROVIDER NOTE - PATIENT PORTAL LINK FT
You can access the FollowMyHealth Patient Portal offered by [facility] by registering at the following website: [email or web address]. By joining [employer]’s FollowMyHealth portal, you will also be able to view your health information using other applications (apps) compatible with our system.

## 2025-02-09 NOTE — ED PROVIDER NOTE - PHYSICAL EXAMINATION
Const: not in acute distress  Eyes: no conjunctival injection  HEENT: Head NCAT, Moist MM.  Neck: Trachea midline.   CVS: +S1/S2, No murmurs or gallops  RESP: Unlabored respiratory effort. Clear to auscultation bilaterally.  GI: Nontender/Nondistended, No CVA tenderness b/l.   Skin: Intact.   Neuro: moving all four extremities  Psych: Awake, Alert, & Cooperative Const: not in acute distress  Eyes: no conjunctival injection  HEENT: Head NCAT, Moist MM.  Neck: Trachea midline.   CVS: +S1/S2, No murmurs or gallops  RESP: Unlabored respiratory effort. Clear to auscultation bilaterally.  GI: Nontender/Nondistended, No CVA tenderness b/l.   Skin: Intact.   Neuro: moving all four extremities  Psych: Awake, Alert, & Cooperative  Attending Karly Nunez: Gen: NAD, heent: atrauamtic, dry mucous membranes, , neck; nttp, no nuchal rigidity, cv: rrr, no murmurs, lungs: ctab, abd: soft, nontender, nondistended,no RLQ ttp. no RUQ ttp no guarding, ext: wwp, neg homans, skin: no rash, neuro: awake and alert, following commands, speech clear, sensation and strength intact, no focal deficits

## 2025-02-09 NOTE — ED ADULT TRIAGE NOTE - ACCOMPANIED BY
Size Of Lesion In Cm (Optional): 0
Detail Level: Generalized
Detail Level: Zone
Body Location Override (Optional - Billing Will Still Be Based On Selected Body Map Location If Applicable): scalp
Detail Level: Simple
Self

## 2025-02-09 NOTE — CONSULT NOTE ADULT - ASSESSMENT
INCOMPLETE NOTE A/P: 28 y/o  Last Menstrual Period: 25 @3w5d by LMP presents to the ED with epigastric pain, nausea and vomiting since early this AM. Patient comfortable at this moment, desiring to sit upright. Patient states that Zofran mildly reduced her nausea though it returns when laying flat. VSS, H/H wnl. Electrolytes wnl. UA wnl. TVUS: No intrauterine gestation. Complex left ovarian cystic structure noted from previous scan with right dermoid cyst present. Low suspicion for ovarian torsion as abdominal exam is benign. Paitent like;y experiencing nausea and vomiting of pregnancy.     #epigastric pain/nausea and vomiting in pregnancy  -s/o Zofran x2 and 2L bolus. Can continue to treat per symptoms with: Zofran 4 mg IV q6hrs prn, Reglan 10 mg IV q6hrs prn, Phenergan Suppository 25 mg BID   -recommend amylase, lipase, and thyroid panel to assess for other causes of nausea/vomiting  -PO challenge  -If patient passes PO challenge, recommend the following for discharge:           -diclegis 10-10--2 tabs at night, 1 tab qam, 1 tab in the afternoon.           -If patient's insurance does not cover diclegis, she may substitute with the following OTC meds: pyridoxine 25mg and 1/2 tab of unisom 25mg together q6h          -zofran 4mg ODT q8h. Patient may increase to 2 tabs q 8h if needed          -pepcid 20mg qam  -counseled to take Unisom as needed    #mild abdominal pain  - pain relieved with Tylenol received during ED stay  -low suspicion for ovarian torsion at this time.   -plan to meet with surgeon to discuss surgical plan given new pregnancy.     d/w Dr. Delgadillo-Chris and Safety Officers    Isabela Brooks MD, PGY-4  A/P: 30 y/o  Last Menstrual Period: 25 @3w5d by LMP presents to the ED with epigastric pain, nausea and vomiting since early this AM. Patient comfortable at this moment, desiring to sit upright. Patient states that Zofran mildly reduced her nausea though it returns when laying flat. VSS, H/H wnl. Electrolytes wnl. UA wnl. TVUS: No intrauterine gestation. Complex left ovarian cystic structure noted from previous scan with right dermoid cyst present. Low suspicion for ovarian torsion as abdominal exam is benign. Patient likely experiencing nausea and vomiting of pregnancy.     #epigastric pain/nausea and vomiting in pregnancy  -s/o Zofran x2 and 2L bolus. Can continue to treat per symptoms with: Zofran 4 mg IV q6hrs prn, Reglan 10 mg IV q6hrs prn, Phenergan Suppository 25 mg BID   -recommend amylase, lipase, and thyroid panel to assess for other causes of nausea/vomiting  -PO challenge  -If patient passes PO challenge, recommend the following for discharge:           -diclegis 10-10--2 tabs at night, 1 tab qam, 1 tab in the afternoon.           -If patient's insurance does not cover diclegis, she may substitute with the following OTC meds: pyridoxine 25mg and 1/2 tab of unisom 25mg together q6h          -zofran 4mg ODT q8h. Patient may increase to 2 tabs q 8h if needed          -pepcid 20mg qam  -counseled to take Unisom as needed    #mild abdominal pain  - pain relieved with Tylenol received during ED stay  -low suspicion for ovarian torsion at this time.   -plan to meet with surgeon to discuss surgical plan given new pregnancy.     d/w Dr. Delgadillo-Chris and Safety Officers    Isabela Brooks MD, PGY-4

## 2025-02-09 NOTE — ED PROVIDER NOTE - PROGRESS NOTE DETAILS
River PGY2: patient seen and evaluated by obgyn.  states if N/V controlled patient can follow up outpatient with her obygn. [facility] PGY2: patient tolerating PO.  UA negative for infection.  will dc with rx for antiemetics and follow up with Dr. Samir carpenter-OB.  patient understands and agreable.

## 2025-02-09 NOTE — ED PROVIDER NOTE - ATTENDING CONTRIBUTION TO CARE
Attending MD Karly Nunez:  I personally have seen and examined this patient.  Resident note reviewed and agree on plan of care and except where noted.  See HPI, PE, and MDM for details.

## 2025-02-10 VITALS
OXYGEN SATURATION: 99 % | TEMPERATURE: 100 F | DIASTOLIC BLOOD PRESSURE: 73 MMHG | SYSTOLIC BLOOD PRESSURE: 108 MMHG | RESPIRATION RATE: 18 BRPM | HEART RATE: 94 BPM

## 2025-02-10 LAB
APPEARANCE UR: CLEAR — SIGNIFICANT CHANGE UP
BACTERIA # UR AUTO: NEGATIVE /HPF — SIGNIFICANT CHANGE UP
BILIRUB UR-MCNC: NEGATIVE — SIGNIFICANT CHANGE UP
CAST: 1 /LPF — SIGNIFICANT CHANGE UP (ref 0–4)
COLOR SPEC: SIGNIFICANT CHANGE UP
DIFF PNL FLD: NEGATIVE — SIGNIFICANT CHANGE UP
GAS PNL BLDV: SIGNIFICANT CHANGE UP
GLUCOSE UR QL: NEGATIVE MG/DL — SIGNIFICANT CHANGE UP
KETONES UR-MCNC: 80 MG/DL
LEUKOCYTE ESTERASE UR-ACNC: NEGATIVE — SIGNIFICANT CHANGE UP
MUCOUS THREADS # UR AUTO: PRESENT
NITRITE UR-MCNC: NEGATIVE — SIGNIFICANT CHANGE UP
PH UR: 6 — SIGNIFICANT CHANGE UP (ref 5–8)
PROT UR-MCNC: 30 MG/DL
RBC CASTS # UR COMP ASSIST: 11 /HPF — HIGH (ref 0–4)
REVIEW: SIGNIFICANT CHANGE UP
SP GR SPEC: >1.03 — HIGH (ref 1–1.03)
SQUAMOUS # UR AUTO: 4 /HPF — SIGNIFICANT CHANGE UP (ref 0–5)
UROBILINOGEN FLD QL: 1 MG/DL — SIGNIFICANT CHANGE UP (ref 0.2–1)
WBC UR QL: 2 /HPF — SIGNIFICANT CHANGE UP (ref 0–5)

## 2025-02-10 PROCEDURE — 86901 BLOOD TYPING SEROLOGIC RH(D): CPT

## 2025-02-10 PROCEDURE — 96376 TX/PRO/DX INJ SAME DRUG ADON: CPT

## 2025-02-10 PROCEDURE — 83735 ASSAY OF MAGNESIUM: CPT

## 2025-02-10 PROCEDURE — 82947 ASSAY GLUCOSE BLOOD QUANT: CPT

## 2025-02-10 PROCEDURE — 99285 EMERGENCY DEPT VISIT HI MDM: CPT | Mod: 25

## 2025-02-10 PROCEDURE — 86850 RBC ANTIBODY SCREEN: CPT

## 2025-02-10 PROCEDURE — 81001 URINALYSIS AUTO W/SCOPE: CPT

## 2025-02-10 PROCEDURE — 87086 URINE CULTURE/COLONY COUNT: CPT

## 2025-02-10 PROCEDURE — 80053 COMPREHEN METABOLIC PANEL: CPT

## 2025-02-10 PROCEDURE — 96375 TX/PRO/DX INJ NEW DRUG ADDON: CPT

## 2025-02-10 PROCEDURE — 84132 ASSAY OF SERUM POTASSIUM: CPT

## 2025-02-10 PROCEDURE — 82435 ASSAY OF BLOOD CHLORIDE: CPT

## 2025-02-10 PROCEDURE — 84295 ASSAY OF SERUM SODIUM: CPT

## 2025-02-10 PROCEDURE — 82330 ASSAY OF CALCIUM: CPT

## 2025-02-10 PROCEDURE — 83690 ASSAY OF LIPASE: CPT

## 2025-02-10 PROCEDURE — 87637 SARSCOV2&INF A&B&RSV AMP PRB: CPT

## 2025-02-10 PROCEDURE — 85025 COMPLETE CBC W/AUTO DIFF WBC: CPT

## 2025-02-10 PROCEDURE — 96374 THER/PROPH/DIAG INJ IV PUSH: CPT

## 2025-02-10 PROCEDURE — 85014 HEMATOCRIT: CPT

## 2025-02-10 PROCEDURE — 76856 US EXAM PELVIC COMPLETE: CPT

## 2025-02-10 PROCEDURE — 0241U: CPT

## 2025-02-10 PROCEDURE — 76830 TRANSVAGINAL US NON-OB: CPT

## 2025-02-10 PROCEDURE — 83605 ASSAY OF LACTIC ACID: CPT

## 2025-02-10 PROCEDURE — 82803 BLOOD GASES ANY COMBINATION: CPT

## 2025-02-10 PROCEDURE — 86900 BLOOD TYPING SEROLOGIC ABO: CPT

## 2025-02-10 PROCEDURE — 84702 CHORIONIC GONADOTROPIN TEST: CPT

## 2025-02-10 PROCEDURE — 93975 VASCULAR STUDY: CPT

## 2025-02-10 PROCEDURE — 85018 HEMOGLOBIN: CPT

## 2025-02-10 RX ORDER — METOCLOPRAMIDE 10 MG/1
1 TABLET ORAL
Qty: 2 | Refills: 0
Start: 2025-02-10 | End: 2025-02-13

## 2025-02-10 RX ORDER — METOCLOPRAMIDE 10 MG/1
1 TABLET ORAL
Qty: 1 | Refills: 0
Start: 2025-02-10 | End: 2025-02-12

## 2025-02-10 RX ORDER — DOXYLAMINE SUCCINATE AND PYRIDOXINE HYDROCHLORIDE, DELAYED RELEASE TABLETS 10 MG/10 MG 10; 10 MG/1; MG/1
2 TABLET, DELAYED RELEASE ORAL
Qty: 28 | Refills: 0
Start: 2025-02-10 | End: 2025-02-23

## 2025-02-10 RX ADMIN — METOCLOPRAMIDE 10 MILLIGRAM(S): 10 TABLET ORAL at 00:20

## 2025-02-10 RX ADMIN — SODIUM CHLORIDE 75 MILLILITER(S): 9 INJECTION, SOLUTION INTRAVENOUS at 01:02

## 2025-02-10 NOTE — CHART NOTE - NSCHARTNOTEFT_GEN_A_CORE
Attending Note     Pt feeling comfortable   previously 6cm   FHTS category 2 with intermittent late decels   pitocin turned off   SVE 10/100/+1  Resuscitate tracing then will begin pushing     GENIE Ly

## 2025-02-11 ENCOUNTER — EMERGENCY (EMERGENCY)
Facility: HOSPITAL | Age: 30
LOS: 1 days | Discharge: HOME | End: 2025-02-11
Attending: EMERGENCY MEDICINE | Admitting: EMERGENCY MEDICINE
Payer: COMMERCIAL

## 2025-02-11 VITALS
OXYGEN SATURATION: 98 % | DIASTOLIC BLOOD PRESSURE: 63 MMHG | HEART RATE: 81 BPM | TEMPERATURE: 98 F | SYSTOLIC BLOOD PRESSURE: 97 MMHG | RESPIRATION RATE: 16 BRPM | WEIGHT: 231.93 LBS

## 2025-02-11 VITALS
DIASTOLIC BLOOD PRESSURE: 66 MMHG | SYSTOLIC BLOOD PRESSURE: 136 MMHG | HEART RATE: 90 BPM | RESPIRATION RATE: 18 BRPM | OXYGEN SATURATION: 99 % | TEMPERATURE: 98 F

## 2025-02-11 LAB
ALBUMIN SERPL ELPH-MCNC: 4 G/DL — SIGNIFICANT CHANGE UP (ref 3.3–5)
ALP SERPL-CCNC: 41 U/L — SIGNIFICANT CHANGE UP (ref 40–120)
ALT FLD-CCNC: 11 U/L — SIGNIFICANT CHANGE UP (ref 4–33)
ANION GAP SERPL CALC-SCNC: 16 MMOL/L — HIGH (ref 7–14)
APPEARANCE UR: ABNORMAL
AST SERPL-CCNC: 15 U/L — SIGNIFICANT CHANGE UP (ref 4–32)
BACTERIA # UR AUTO: ABNORMAL /HPF
BASOPHILS # BLD AUTO: 0.06 K/UL — SIGNIFICANT CHANGE UP (ref 0–0.2)
BASOPHILS NFR BLD AUTO: 0.8 % — SIGNIFICANT CHANGE UP (ref 0–2)
BILIRUB SERPL-MCNC: 0.4 MG/DL — SIGNIFICANT CHANGE UP (ref 0.2–1.2)
BILIRUB UR-MCNC: NEGATIVE — SIGNIFICANT CHANGE UP
BUN SERPL-MCNC: 10 MG/DL — SIGNIFICANT CHANGE UP (ref 7–23)
CALCIUM SERPL-MCNC: 9 MG/DL — SIGNIFICANT CHANGE UP (ref 8.4–10.5)
CAST: 1 /LPF — SIGNIFICANT CHANGE UP (ref 0–4)
CHLORIDE SERPL-SCNC: 102 MMOL/L — SIGNIFICANT CHANGE UP (ref 98–107)
CO2 SERPL-SCNC: 20 MMOL/L — LOW (ref 22–31)
COLOR SPEC: YELLOW — SIGNIFICANT CHANGE UP
CREAT SERPL-MCNC: 0.87 MG/DL — SIGNIFICANT CHANGE UP (ref 0.5–1.3)
CULTURE RESULTS: SIGNIFICANT CHANGE UP
DIFF PNL FLD: NEGATIVE — SIGNIFICANT CHANGE UP
EGFR: 92 ML/MIN/1.73M2 — SIGNIFICANT CHANGE UP
EOSINOPHIL # BLD AUTO: 0.09 K/UL — SIGNIFICANT CHANGE UP (ref 0–0.5)
EOSINOPHIL NFR BLD AUTO: 1.1 % — SIGNIFICANT CHANGE UP (ref 0–6)
GLUCOSE SERPL-MCNC: 78 MG/DL — SIGNIFICANT CHANGE UP (ref 70–99)
GLUCOSE UR QL: NEGATIVE MG/DL — SIGNIFICANT CHANGE UP
HCG SERPL-ACNC: 857 MIU/ML — SIGNIFICANT CHANGE UP
HCT VFR BLD CALC: 41.4 % — SIGNIFICANT CHANGE UP (ref 34.5–45)
HGB BLD-MCNC: 13.4 G/DL — SIGNIFICANT CHANGE UP (ref 11.5–15.5)
IANC: 4.23 K/UL — SIGNIFICANT CHANGE UP (ref 1.8–7.4)
IMM GRANULOCYTES NFR BLD AUTO: 0.3 % — SIGNIFICANT CHANGE UP (ref 0–0.9)
KETONES UR-MCNC: 15 MG/DL
LEUKOCYTE ESTERASE UR-ACNC: ABNORMAL
LYMPHOCYTES # BLD AUTO: 3.08 K/UL — SIGNIFICANT CHANGE UP (ref 1–3.3)
LYMPHOCYTES # BLD AUTO: 38.5 % — SIGNIFICANT CHANGE UP (ref 13–44)
MCHC RBC-ENTMCNC: 29 PG — SIGNIFICANT CHANGE UP (ref 27–34)
MCHC RBC-ENTMCNC: 32.4 G/DL — SIGNIFICANT CHANGE UP (ref 32–36)
MCV RBC AUTO: 89.6 FL — SIGNIFICANT CHANGE UP (ref 80–100)
MONOCYTES # BLD AUTO: 0.51 K/UL — SIGNIFICANT CHANGE UP (ref 0–0.9)
MONOCYTES NFR BLD AUTO: 6.4 % — SIGNIFICANT CHANGE UP (ref 2–14)
NEUTROPHILS # BLD AUTO: 4.23 K/UL — SIGNIFICANT CHANGE UP (ref 1.8–7.4)
NEUTROPHILS NFR BLD AUTO: 52.9 % — SIGNIFICANT CHANGE UP (ref 43–77)
NITRITE UR-MCNC: NEGATIVE — SIGNIFICANT CHANGE UP
NRBC # BLD AUTO: 0 K/UL — SIGNIFICANT CHANGE UP (ref 0–0)
NRBC # FLD: 0 K/UL — SIGNIFICANT CHANGE UP (ref 0–0)
NRBC BLD AUTO-RTO: 0 /100 WBCS — SIGNIFICANT CHANGE UP (ref 0–0)
PH UR: 6 — SIGNIFICANT CHANGE UP (ref 5–8)
PLATELET # BLD AUTO: 319 K/UL — SIGNIFICANT CHANGE UP (ref 150–400)
POTASSIUM SERPL-MCNC: 3.4 MMOL/L — LOW (ref 3.5–5.3)
POTASSIUM SERPL-SCNC: 3.4 MMOL/L — LOW (ref 3.5–5.3)
PROT SERPL-MCNC: 7.1 G/DL — SIGNIFICANT CHANGE UP (ref 6–8.3)
PROT UR-MCNC: SIGNIFICANT CHANGE UP MG/DL
RBC # BLD: 4.62 M/UL — SIGNIFICANT CHANGE UP (ref 3.8–5.2)
RBC # FLD: 13.6 % — SIGNIFICANT CHANGE UP (ref 10.3–14.5)
RBC CASTS # UR COMP ASSIST: 8 /HPF — HIGH (ref 0–4)
REVIEW: SIGNIFICANT CHANGE UP
SODIUM SERPL-SCNC: 138 MMOL/L — SIGNIFICANT CHANGE UP (ref 135–145)
SP GR SPEC: 1.03 — SIGNIFICANT CHANGE UP (ref 1–1.03)
SPECIMEN SOURCE: SIGNIFICANT CHANGE UP
SQUAMOUS # UR AUTO: 6 /HPF — HIGH (ref 0–5)
UROBILINOGEN FLD QL: 1 MG/DL — SIGNIFICANT CHANGE UP (ref 0.2–1)
WBC # BLD: 7.99 K/UL — SIGNIFICANT CHANGE UP (ref 3.8–10.5)
WBC # FLD AUTO: 7.99 K/UL — SIGNIFICANT CHANGE UP (ref 3.8–10.5)
WBC UR QL: 4 /HPF — SIGNIFICANT CHANGE UP (ref 0–5)

## 2025-02-11 PROCEDURE — 99285 EMERGENCY DEPT VISIT HI MDM: CPT

## 2025-02-11 PROCEDURE — 76817 TRANSVAGINAL US OBSTETRIC: CPT | Mod: 26

## 2025-02-11 RX ORDER — SODIUM CHLORIDE 9 G/ML
1000 INJECTION, SOLUTION INTRAVENOUS ONCE
Refills: 0 | Status: COMPLETED | OUTPATIENT
Start: 2025-02-11 | End: 2025-02-11

## 2025-02-11 RX ADMIN — SODIUM CHLORIDE 1000 MILLILITER(S): 9 INJECTION, SOLUTION INTRAVENOUS at 15:26

## 2025-02-11 NOTE — ED ADULT TRIAGE NOTE - CHIEF COMPLAINT QUOTE
Pt was sent in by OB (Dr. rodríguez) to r/o ectopic pregnancy. Pts LMP 25, . Denies vaginal bleeding. Pt admits to abdominal discomfort. History of ovarian cyst.

## 2025-02-11 NOTE — ED PROVIDER NOTE - PATIENT PORTAL LINK FT
You can access the FollowMyHealth Patient Portal offered by [facility] by registering at the following website: [email or web address]. By joining [employer]’s FollowMyHealth portal, you will also be able to view your health information using other applications (apps) compatible with our system.

## 2025-02-11 NOTE — ED PROVIDER NOTE - PROGRESS NOTE DETAILS
Kun:  OB/GYN consulted to evaluate pt Patient seen by GYN.  To be DC'd home on Reglan and return precautions and repeat blood work in 48 hours.

## 2025-02-11 NOTE — ED ADULT NURSE NOTE - OBJECTIVE STATEMENT
Pt is alert and orientedx4, ambulatory at baseline. Pt presents to the ED by her OBGYN for R/O ECTOPIC. Pt denies bleeding, endorsing abd pain. Pt denies chest pain, shortness of breath, dyspnea on exertion, breathing is unlabored and even. Pt denies nausea, vomiting or diarrhea. 20G IV placed in LAC, labs drawn, awaiting further orders. Call bell within reach, bed in lowest position, will continue to monitor.

## 2025-02-11 NOTE — ED PROVIDER NOTE - NSFOLLOWUPINSTRUCTIONS_ED_ALL_ED_FT
In the emergency department you had blood work and an ultrasound performed a copy of all available results are included in your discharge paperwork.  You will see Dr. Elie Perez in the office on Thursday for repeat blood work and/or ultrasound.  You may take Tylenol for any pain that you have.  A prescription for Reglan was sent to your pharmacy.  Please take as directed.  Please return to the ED immediately if you have any significant increase in pain, feel lightheaded, have vomiting that is not controlled with the Reglan, vaginal bleeding or other concerns.

## 2025-02-11 NOTE — CONSULT NOTE ADULT - ASSESSMENT
30 y/o  LMP  presenting from private GYN office with PUL. Ultrasound in office today with free fluid, patient sent in for further evaluation.     - Recs pending CBC, CMP, bHCG, T&S, TVUS    Dalia Walsh PA  d/w Dr. StewardChris 30 y/o  LMP  presenting from private GYN office with PUL. Ultrasound in office today with free fluid, patient sent in for further evaluation. TVUS today with new intrauterine cystic structure with unchanged findings of large left adnexal cystic structure and right dermoid with small pelvic free fluid. bHCG rising appropriately, [id number](), remainder of labs within normal limits, H&H stable from prior and vital signs stable. Patient asymptomatic, no abdominal pain, no vaginal bleeding, no lightheadedness or dizziness.     - Repeat bHCG on , Dr. Ly will follow up results with patient   - Bleeding precautions reviewed. Pt advised to return to the ED if she is soaking through 1 large pad/hour for > 2 hours, clot passage larger than fist-sized, experiences lightheadedness, dizziness, vomiting, inability to tolerate PO, severe abdominal pain.     ELVIA Patel  Seen and counseled with Dr. Ly

## 2025-02-11 NOTE — ED ADULT TRIAGE NOTE - GLASGOW COMA SCALE: EYE OPENING, MLM
Will review fax tomorrow for more clarification. There is not much information in care everywhere.    (E4) spontaneous

## 2025-02-11 NOTE — ED PROVIDER NOTE - CLINICAL SUMMARY MEDICAL DECISION MAKING FREE TEXT BOX
Patient is a 29-year-old  woman  with no significant PMHx who came to the ED per her OB (Dr. Ly) to r/o ectopic pregnancy. Patient has been vomiting since this weekend, had a positive pregnancy test on Saturday. LMP 25. Patient has hx of ovarian cysts (one on right and one on the left). Sent by OB earlier today. Sent by OB to the ED for free fluid in pelvis r/o ectopic requesting pelvic sono, MRI, and bloodwork. Physical exam with heart regular rate and rhythm, lungs clear to ascultation bilaterally, abdomen soft and nontender.    **INCOMPLETE** Patient is a 29-year-old  woman  with no significant PMHx who came to the ED per her OB (Dr. Ly) to r/o ectopic pregnancy. Patient has been vomiting since this weekend, had a positive pregnancy test on Saturday. LMP 25. Patient has hx of ovarian cysts (one on right and one on the left). Sent by OB earlier today. Sent by OB to the ED for free fluid in pelvis r/o ectopic pregnancy. Physical exam with heart regular rate and rhythm, lungs clear to ascultation bilaterally, abdomen soft and nontender. Based on symptoms, most concerned for ectopic pregnancy. Will order CBC and CMP for possible electrolyte abnormality. Order HCG and transvaginal ultrasound for possible ectopic pregnancy.

## 2025-02-11 NOTE — CONSULT NOTE ADULT - ATTENDING COMMENTS
Attending Note   Agree with above  will f/u in 48 h ours  precautions reivewed Attending Note     Pt seen in my office today with  ? increased free fluid on TVUS   pt has highly desired pregnancy   reports no pain or bleeding   nausea has resolved with diclegis and reglan   on exam nontender nondistended  TVUS as above  hcg rising appropriately   discussed with pt regarding MRI to further evaluate pelvis  pt declines at this time   strict precautions reviewed  plan for hcg in 48 hours     GENIE Ly MD

## 2025-02-11 NOTE — ED PROVIDER NOTE - OBJECTIVE STATEMENT
Jossy Clay is a 29-year-old  woman  with no significant PMHx who came to the ED per her OB (Dr. Ly) to r/o ectopic pregnancy. Patient stated that she first began feeling pain on Thursday (25). Patient continued to feel sick and began vomiting during the weekend. Emesis described as bilious, nonbloody. Went to [facility] on  and was given medication but still not feeling well. Patient had a positive pregnancy test this weekend. Per chart, LMP 25. Of note, patient has a right ovarian cyst and left ovarian cyst. Patient went to OB today and was found to have free fluid in pelvis. Patient sent by OB to ED for pelvic sono, MRI, and bloodwork. Denies chest pain, SOB, abdominal pain, vaginal bleeding. Patient was on Wegovy but stopped after finding out about pregnancy. Jossy Clay is a 29-year-old woman  with no significant PMHx who came to the ED per her OB (Dr. Ly) to r/o ectopic pregnancy. Patient stated that she first began feeling pain on Thursday (25). Patient continued to feel sick and began vomiting during the weekend. Emesis described as bilious, nonbloody. Went to [facility] on  and was given medication but still not feeling well. Patient had a positive pregnancy test this weekend. Per chart, LMP 25. Of note, patient has a right ovarian cyst and left ovarian cyst. Patient went to OB today and was found to have free fluid in pelvis. Patient sent by OB to ED for pelvic sono, MRI, and bloodwork. Denies chest pain, SOB, abdominal pain, vaginal bleeding. Patient was on Wegovy but stopped after finding out about pregnancy. Jossy Clay is a 29-year-old woman  with no significant PMHx who came to the ED per her OB (Dr. Ly) to r/o ectopic pregnancy. Patient stated that she first began feeling pain on Thursday (25). Patient continued to feel sick and began vomiting during the weekend. Emesis described as bilious, nonbloody. Went to [facility] on  and was given medication but still not feeling well. Patient had a positive pregnancy test this weekend. Per chart, LMP 25. Of note, patient has a right ovarian cyst and left ovarian cyst. Patient went to OB today and was found to have free fluid in pelvis. Patient sent by OB to ED for pelvic sono, MRI, and bloodwork. Denies chest pain, SOB, abdominal pain, vaginal bleeding. Patient was on Wegovy but stopped after finding out about pregnancy.    Attendinyo female presents with abnormal outpatient ultrasound today.  pt had repeat ultrasound by Dr. Ly today and found to have increased free fluid in the pelvis.  sent to ed for evaluation of possibly ectopic pregnancy.  pt had pain 2 days ago but that has improved.  no bleeding.  no vomiting or nausea today.

## 2025-02-11 NOTE — CONSULT NOTE ADULT - SUBJECTIVE AND OBJECTIVE BOX
RAMIREZ SIMS  29y  Female [id number]    HPI: 30 y/o  LMP  presenting from private GYN office with PUL. Patient seen at [facility] over the weekend for nausea, vomiting and abdominal pain. Found to have PUL with bHCG of 333, TVUS with no IUP w/ left complex cyst that measures 7.9 x 6.5 x 8.5cm as well as right dermoid cyst. Patient currently scheduled for cystectomy with Dr. Wise in March. Patient seen by Dr. Elie Perez in the office today given PUL, ultrasound in office noted to have free fluid so patient sent in for further evaluation. Patient asymptomatic, denies vaginal bleeding. Nausea/vomiting has since resolved. Denies fevers, chills, abdominal pain, SOB, chest pain.         Name of GYN Physician: Aliyah  OBHx: x1 , x1 TOP, x1 SAB  GynHx: Hx cysts. Denies fibroids, endometriosis, STI's, Abnormal pap smears   PMH: Asthma  PSH: D&C  Meds: Wegovy(now has stopped)  Allx: NKDA  Social History:  Denies smoking use, drug use, alcohol use.     Vital Signs Last 24 Hrs  T(C): 36.8 (2025 13:23), Max: 36.8 (2025 13:23)  T(F): 98.3 (2025 13:23), Max: 98.3 (2025 13:23)  HR: 81 (2025 13:23) (81 - 81)  BP: 97/63 (2025 13:23) (97/63 - 97/63)  BP(mean): --  RR: 16 (2025 13:23) (16 - 16)  SpO2: 98% (2025 13:23) (98% - 98%)    Parameters below as of 2025 13:23  Patient On (Oxygen Delivery Method): room air        Physical Exam:   General: sitting comfortably in bed, NAD   HEENT: neck supple, full ROM  CV: well perfused  Lungs: normal respirations   Abd: Soft, non-tender, mildly distended,  :  No bleeding on pad.      LABS:                        13.4   7.99  )-----------( 319      ( 2025 14:27 )             41.4     -    141  |  104  |  9   ----------------------------<  139[H]  4.1   |  19[L]  |  0.90    Ca    10.2      2025 18:46  Mg     2.0         TPro  8.2  /  Alb  4.6  /  TBili  0.6  /  DBili  x   /  AST  10  /  ALT  12  /  AlkPhos  48      I&O's Detail      Urinalysis Basic - ( 2025 15:54 )    Color: Yellow / Appearance: Cloudy / S.028 / pH: x  Gluc: x / Ketone: 15 mg/dL  / Bili: Negative / Urobili: 1.0 mg/dL   Blood: x / Protein: Trace mg/dL / Nitrite: Negative   Leuk Esterase: Trace / RBC: x / WBC x   Sq Epi: x / Non Sq Epi: x / Bacteria: x        RADIOLOGY & ADDITIONAL STUDIES:     RAMIREZ SIMS  29y  Female [id number]    HPI: 30 y/o  LMP  presenting from private GYN office with PUL. Patient seen at [facility] over the weekend for nausea, vomiting and abdominal pain. Found to have PUL with bHCG of 333, TVUS with no IUP w/ left complex cyst that measures 7.9 x 6.5 x 8.5cm as well as right dermoid cyst. Patient currently scheduled for cystectomy with Dr. Wise in March. Patient seen by Dr. Elie Perez in the office today given PUL, ultrasound in office noted to have free fluid so patient sent in for further evaluation. Patient asymptomatic, denies vaginal bleeding. Nausea/vomiting has since resolved. Denies fevers, chills, abdominal pain, SOB, chest pain. This is a desired pregnancy.         Name of GYN Physician: Aliyah  OBHx: x1 , x1 TOP, x1 SAB  GynHx: Hx cysts. Denies fibroids, endometriosis, STI's, Abnormal pap smears   PMH: Asthma  PSH: D&C  Meds: Wegovy(now has stopped)  Allx: NKDA  Social History:  Denies smoking use, drug use, alcohol use.     Vital Signs Last 24 Hrs  T(C): 36.8 (2025 13:23), Max: 36.8 (2025 13:23)  T(F): 98.3 (2025 13:23), Max: 98.3 (2025 13:23)  HR: 81 (2025 13:23) (81 - 81)  BP: 97/63 (2025 13:23) (97/63 - 97/63)  BP(mean): --  RR: 16 (2025 13:23) (16 - 16)  SpO2: 98% (2025 13:23) (98% - 98%)    Parameters below as of 2025 13:23  Patient On (Oxygen Delivery Method): room air        Physical Exam:   General: sitting comfortably in bed, NAD   HEENT: neck supple, full ROM  CV: well perfused  Lungs: normal respirations   Abd: Soft, non-tender, mildly distended,  :  No bleeding on pad.      LABS:                        13.4   7.99  )-----------( 319      ( 2025 14:27 )             41.4         141  |  104  |  9   ----------------------------<  139[H]  4.1   |  19[L]  |  0.90    Ca    10.2      2025 18:46  Mg     2.0         TPro  8.2  /  Alb  4.6  /  TBili  0.6  /  DBili  x   /  AST  10  /  ALT  12  /  AlkPhos  48      I&O's Detail      Urinalysis Basic - ( 2025 15:54 )    Color: Yellow / Appearance: Cloudy / S.028 / pH: x  Gluc: x / Ketone: 15 mg/dL  / Bili: Negative / Urobili: 1.0 mg/dL   Blood: x / Protein: Trace mg/dL / Nitrite: Negative   Leuk Esterase: Trace / RBC: x / WBC x   Sq Epi: x / Non Sq Epi: x / Bacteria: x        RADIOLOGY & ADDITIONAL STUDIES:  < from: US Transvaginal, OB (25 @ 16:56) >    ACC: [id number] EXAM:  US OB TRANSVAGINAL   ORDERED BY: MADDIE NEVES     PROCEDURE DATE:  2025          INTERPRETATION:  CLINICAL INFORMATION: Pregnancy, abdominal pain, more   free fluid on outpatient ultrasound. Right ectopic.    LMP: 2025    Estimated Gestational Age by LMP: 4 weeks 0 days    COMPARISON: Pelvic sonogram 2025. MRI pelvis 2024.    TECHNIQUE: Endovaginal pelvic sonogram only.    FINDINGS:  Uterus: 8.8 x 4.8 x 4.8 cm. Cystic structure in the endometrium measuring   3 mm, suggestive of early gestational sac.    Right ovary: 2.9 cm x 2.1 cm x 3.0 cm. 3.2 x 2.8 x 2.9 cm ovarian   dermoid. Normal ovarian waveforms.  Left ovary: Bilobed adnexal cyst with thin septation measuring 8.1 x 4.7   cm posterior to the uterus, unchanged in position. Normal arterial and   venous waveforms in the ovarian parenchyma.    Fluid: Small to moderate volume free fluid, mildly increased..  .  IMPRESSION:  Small intrauterine cystic structure suggestive of early gestational sac.  Recommend correlation with serum beta-hCG and follow-up pelvic sonogram.    Large complex left adnexal cystic structure and right ovarian dermoid,   unchanged from prior exam.    Small to moderate pelvic free fluid, mildly increased.    --- End of Report ---            NEENA STAFFORD MD; Attending Radiologist  This document has been electronically signed. 2025  5:06PM    < end of copied text >

## 2025-02-23 ENCOUNTER — INPATIENT (INPATIENT)
Facility: HOSPITAL | Age: 30
LOS: 0 days | Discharge: HOME | End: 2025-02-24
Attending: OBSTETRICS & GYNECOLOGY | Admitting: OBSTETRICS & GYNECOLOGY
Payer: COMMERCIAL

## 2025-02-23 VITALS
HEART RATE: 82 BPM | DIASTOLIC BLOOD PRESSURE: 81 MMHG | RESPIRATION RATE: 16 BRPM | TEMPERATURE: 98 F | SYSTOLIC BLOOD PRESSURE: 129 MMHG | WEIGHT: 227.96 LBS | OXYGEN SATURATION: 100 %

## 2025-02-23 PROCEDURE — 99053 MED SERV 10PM-8AM 24 HR FAC: CPT

## 2025-02-23 PROCEDURE — 99291 CRITICAL CARE FIRST HOUR: CPT

## 2025-02-23 RX ORDER — ONDANSETRON HCL/PF 4 MG/2 ML
4 VIAL (ML) INJECTION ONCE
Refills: 0 | Status: DISCONTINUED | OUTPATIENT
Start: 2025-02-23 | End: 2025-02-23

## 2025-02-23 RX ORDER — METOCLOPRAMIDE HCL 10 MG
10 TABLET ORAL ONCE
Refills: 0 | Status: COMPLETED | OUTPATIENT
Start: 2025-02-23 | End: 2025-02-23

## 2025-02-23 RX ADMIN — Medication 10 MILLIGRAM(S): at 23:36

## 2025-02-23 RX ADMIN — Medication 1000 MILLILITER(S): at 23:32

## 2025-02-23 RX ADMIN — Medication 4 MILLIGRAM(S): at 23:32

## 2025-02-23 NOTE — ED ADULT NURSE NOTE - CHIEF COMPLAINT QUOTE
c/o non radiating  LLQ abdominal pain, n/vx2 days. pt seen here 2 weeks ago for same sx  Pt , LMP , about 5 weeks pregnant. denies fevers, chills, urinary sx, diarrhea, vag bleeding/discharge. hx of asthma

## 2025-02-23 NOTE — ED ADULT NURSE NOTE - OBJECTIVE STATEMENT
Pt received to intake, A&Ox4. pt approx 5 weeks pregnant, LMP 25. . pt endorsing severe LLQ pain, nausea, vomiting x 2 days. Pt appears uncomfortable, actively vomiting. pt denies fevers, chills, SOB, chest pain, urinary symptoms, vaginal bleeding. respirations even and unlabored. 20G IV placed to right ac, labs sent. medicated per MAR. pending US. safety maintained throughout, call bell in reach

## 2025-02-23 NOTE — ED PROVIDER NOTE - CLINICAL SUMMARY MEDICAL DECISION MAKING FREE TEXT BOX
30 y/o female with pmhx of 8cm left ovarian cyst, LMP   five weeks pregnant presents to ED c/o LLQ pain and N/V. Pt seen at [facility]  and  for r/o ectopic pregnancy. Pt states she followed up outpatient for blood work and everything was okay. Pt developed severe LLQ pain with multiple episodes of n/v. 10/10. No vaginal bleeding or discharge. Ultrasound on  showed small intrauterine early gestational sac. pt presentation concerning for ovarian torsion. plan to check labs including hcg, type and screen, TVUS, provide pain control w/ morphine, antiemetics, fluids. pt amenable to morphine aware of pregnancy category. Pradeep att: 28 y/o female with pmhx of 8cm left ovarian cyst, LMP   five weeks pregnant presents to ED c/o LLQ pain and N/V. Pt seen at [facility]  and  for r/o ectopic pregnancy. Pt states she followed up outpatient for blood work and everything was okay. Pt developed severe LLQ pain with multiple episodes of n/v. 10/10. No vaginal bleeding or discharge. Ultrasound on  showed small intrauterine early gestational sac. pt presentation concerning for ovarian torsion. plan to check labs including hcg, type and screen, TVUS, provide pain control w/ morphine, antiemetics, fluids. pt amenable to morphine aware of pregnancy category and states that this is an unwanted pregnancy.   GYN aware of the patient when images were uploaded to PACS and called immediately for consult for operative repair of ovarian torsion.

## 2025-02-23 NOTE — ED PROVIDER NOTE - OBJECTIVE STATEMENT
28 y/o female with pmhx of 8cm left ovarian cyst, LMP   five weeks pregnant presents to ED c/o LLQ pain and N/V. Pt seen at [facility]  and  for r/o ectopic pregnancy. Pt states she followed up outpatient for blood work and everything was okay. Pt developed severe LLQ pain with multiple episodes of n/v. 10/10. No vaginal bleeding or discharge. Ultrasound on  showed small intrauterine early gestational sac.

## 2025-02-23 NOTE — ED PROVIDER NOTE - PROGRESS NOTE DETAILS
ELVIA Osorio- OB called for consult discussed concern for ovarian torsion clinically. plan for labs, pain control, and TVUS. I called US tech to come to ER for bedside US although cannot at this time. will check ED US, provide pain control. ELVIA Osorio- pt stable, pain well controlled. spoke with charge RN will have ED tech take pt up to US to expedite. bedside ER US with +IUP per Dr. Fernández ELVIA Osorio - received call from radiology, US with ovarian torsion, +lead point and edema. no arterial flow. spoke with OB aware of results. awaiting recommendations. pt given 2nd dose of morphine. ELVIA Osorio - received call from radiology, US with ovarian torsion, +lead point and edema. no arterial flow. spoke with OB, has seen pt, made aware of results. awaiting recommendations. pt given 2nd dose of morphine. ELVIA Osorio- spoke with OB will admit to Dr. James. ELVIA Osorio- spoke with OB will admit to Dr. James. pt to go to OR within the hour.

## 2025-02-23 NOTE — ED ADULT TRIAGE NOTE - CHIEF COMPLAINT QUOTE
c/o non radiating  LLQ abdominal pain, n/vx2 days. pt seen here 2 weeks ago for same sx  Pt , LMP . denies fevers, chills, urinary sx, diarrhea, vag bleeding/discharge. denies medical hx. c/o non radiating  LLQ abdominal pain, n/vx2 days. pt seen here 2 weeks ago for same sx  Pt , LMP , about 5 weeks pregnant. denies fevers, chills, urinary sx, diarrhea, vag bleeding/discharge. hx of asthma

## 2025-02-24 ENCOUNTER — TRANSCRIPTION ENCOUNTER (OUTPATIENT)
Age: 30
End: 2025-02-24

## 2025-02-24 ENCOUNTER — RESULT REVIEW (OUTPATIENT)
Age: 30
End: 2025-02-24

## 2025-02-24 VITALS
RESPIRATION RATE: 17 BRPM | SYSTOLIC BLOOD PRESSURE: 122 MMHG | OXYGEN SATURATION: 97 % | DIASTOLIC BLOOD PRESSURE: 66 MMHG | HEART RATE: 90 BPM

## 2025-02-24 DIAGNOSIS — N83.519 TORSION OF OVARY AND OVARIAN PEDICLE, UNSPECIFIED SIDE: ICD-10-CM

## 2025-02-24 LAB
ALBUMIN SERPL ELPH-MCNC: 4.1 G/DL — SIGNIFICANT CHANGE UP (ref 3.3–5)
ALP SERPL-CCNC: 55 U/L — SIGNIFICANT CHANGE UP (ref 40–120)
ALT FLD-CCNC: 42 U/L — HIGH (ref 4–33)
ANION GAP SERPL CALC-SCNC: 19 MMOL/L — HIGH (ref 7–14)
APPEARANCE UR: CLEAR — SIGNIFICANT CHANGE UP
APTT BLD: 29.4 SEC — SIGNIFICANT CHANGE UP (ref 24.5–35.6)
AST SERPL-CCNC: 36 U/L — HIGH (ref 4–32)
BASOPHILS # BLD AUTO: 0.03 K/UL — SIGNIFICANT CHANGE UP (ref 0–0.2)
BASOPHILS NFR BLD AUTO: 0.4 % — SIGNIFICANT CHANGE UP (ref 0–2)
BILIRUB SERPL-MCNC: 0.4 MG/DL — SIGNIFICANT CHANGE UP (ref 0.2–1.2)
BILIRUB UR-MCNC: NEGATIVE — SIGNIFICANT CHANGE UP
BLD GP AB SCN SERPL QL: NEGATIVE — SIGNIFICANT CHANGE UP
BUN SERPL-MCNC: 12 MG/DL — SIGNIFICANT CHANGE UP (ref 7–23)
CALCIUM SERPL-MCNC: 9.7 MG/DL — SIGNIFICANT CHANGE UP (ref 8.4–10.5)
CHLORIDE SERPL-SCNC: 99 MMOL/L — SIGNIFICANT CHANGE UP (ref 98–107)
CO2 SERPL-SCNC: 18 MMOL/L — LOW (ref 22–31)
COLOR SPEC: YELLOW — SIGNIFICANT CHANGE UP
CREAT SERPL-MCNC: 0.71 MG/DL — SIGNIFICANT CHANGE UP (ref 0.5–1.3)
DIFF PNL FLD: NEGATIVE — SIGNIFICANT CHANGE UP
EGFR: 117 ML/MIN/1.73M2 — SIGNIFICANT CHANGE UP
EOSINOPHIL # BLD AUTO: 0.04 K/UL — SIGNIFICANT CHANGE UP (ref 0–0.5)
EOSINOPHIL NFR BLD AUTO: 0.5 % — SIGNIFICANT CHANGE UP (ref 0–6)
GLUCOSE SERPL-MCNC: 150 MG/DL — HIGH (ref 70–99)
GLUCOSE UR QL: NEGATIVE MG/DL — SIGNIFICANT CHANGE UP
HCG SERPL-ACNC: SIGNIFICANT CHANGE UP MIU/ML
HCT VFR BLD CALC: 38.6 % — SIGNIFICANT CHANGE UP (ref 34.5–45)
HGB BLD-MCNC: 13.1 G/DL — SIGNIFICANT CHANGE UP (ref 11.5–15.5)
IANC: 5.41 K/UL — SIGNIFICANT CHANGE UP (ref 1.8–7.4)
IMM GRANULOCYTES NFR BLD AUTO: 0.5 % — SIGNIFICANT CHANGE UP (ref 0–0.9)
INR BLD: 1.04 RATIO — SIGNIFICANT CHANGE UP (ref 0.85–1.16)
KETONES UR-MCNC: >=160 MG/DL
LEUKOCYTE ESTERASE UR-ACNC: NEGATIVE — SIGNIFICANT CHANGE UP
LYMPHOCYTES # BLD AUTO: 1.93 K/UL — SIGNIFICANT CHANGE UP (ref 1–3.3)
LYMPHOCYTES # BLD AUTO: 24.3 % — SIGNIFICANT CHANGE UP (ref 13–44)
MCHC RBC-ENTMCNC: 29 PG — SIGNIFICANT CHANGE UP (ref 27–34)
MCHC RBC-ENTMCNC: 33.9 G/DL — SIGNIFICANT CHANGE UP (ref 32–36)
MCV RBC AUTO: 85.4 FL — SIGNIFICANT CHANGE UP (ref 80–100)
MONOCYTES # BLD AUTO: 0.5 K/UL — SIGNIFICANT CHANGE UP (ref 0–0.9)
MONOCYTES NFR BLD AUTO: 6.3 % — SIGNIFICANT CHANGE UP (ref 2–14)
NEUTROPHILS # BLD AUTO: 5.41 K/UL — SIGNIFICANT CHANGE UP (ref 1.8–7.4)
NEUTROPHILS NFR BLD AUTO: 68 % — SIGNIFICANT CHANGE UP (ref 43–77)
NITRITE UR-MCNC: NEGATIVE — SIGNIFICANT CHANGE UP
NRBC # BLD AUTO: 0 K/UL — SIGNIFICANT CHANGE UP (ref 0–0)
NRBC # FLD: 0 K/UL — SIGNIFICANT CHANGE UP (ref 0–0)
NRBC BLD AUTO-RTO: 0 /100 WBCS — SIGNIFICANT CHANGE UP (ref 0–0)
PH UR: 6.5 — SIGNIFICANT CHANGE UP (ref 5–8)
PLATELET # BLD AUTO: 362 K/UL — SIGNIFICANT CHANGE UP (ref 150–400)
POTASSIUM SERPL-MCNC: 3.8 MMOL/L — SIGNIFICANT CHANGE UP (ref 3.5–5.3)
POTASSIUM SERPL-SCNC: 3.8 MMOL/L — SIGNIFICANT CHANGE UP (ref 3.5–5.3)
PROT SERPL-MCNC: 7.5 G/DL — SIGNIFICANT CHANGE UP (ref 6–8.3)
PROT UR-MCNC: SIGNIFICANT CHANGE UP MG/DL
PROTHROM AB SERPL-ACNC: 12.1 SEC — SIGNIFICANT CHANGE UP (ref 9.9–13.4)
RBC # BLD: 4.52 M/UL — SIGNIFICANT CHANGE UP (ref 3.8–5.2)
RBC # FLD: 13.2 % — SIGNIFICANT CHANGE UP (ref 10.3–14.5)
RH IG SCN BLD-IMP: POSITIVE — SIGNIFICANT CHANGE UP
SODIUM SERPL-SCNC: 136 MMOL/L — SIGNIFICANT CHANGE UP (ref 135–145)
SP GR SPEC: 1.03 — SIGNIFICANT CHANGE UP (ref 1–1.03)
UROBILINOGEN FLD QL: 1 MG/DL — SIGNIFICANT CHANGE UP (ref 0.2–1)
WBC # BLD: 7.95 K/UL — SIGNIFICANT CHANGE UP (ref 3.8–10.5)
WBC # FLD AUTO: 7.95 K/UL — SIGNIFICANT CHANGE UP (ref 3.8–10.5)

## 2025-02-24 PROCEDURE — 76817 TRANSVAGINAL US OBSTETRIC: CPT | Mod: 26

## 2025-02-24 PROCEDURE — 88305 TISSUE EXAM BY PATHOLOGIST: CPT | Mod: 26

## 2025-02-24 PROCEDURE — 76815 OB US LIMITED FETUS(S): CPT | Mod: 26

## 2025-02-24 DEVICE — VISTASEAL FIBRIN HUMAN 10ML: Type: IMPLANTABLE DEVICE | Status: FUNCTIONAL

## 2025-02-24 RX ORDER — OXYCODONE HYDROCHLORIDE 30 MG/1
5 TABLET ORAL ONCE
Refills: 0 | Status: DISCONTINUED | OUTPATIENT
Start: 2025-02-24 | End: 2025-02-24

## 2025-02-24 RX ORDER — IBUPROFEN 200 MG
1 TABLET ORAL
Refills: 0 | DISCHARGE

## 2025-02-24 RX ORDER — SODIUM CHLORIDE 9 G/1000ML
1000 INJECTION, SOLUTION INTRAVENOUS
Refills: 0 | Status: DISCONTINUED | OUTPATIENT
Start: 2025-02-24 | End: 2025-02-24

## 2025-02-24 RX ORDER — ONDANSETRON HCL/PF 4 MG/2 ML
4 VIAL (ML) INJECTION ONCE
Refills: 0 | Status: COMPLETED | OUTPATIENT
Start: 2025-02-24 | End: 2025-02-24

## 2025-02-24 RX ORDER — FENTANYL CITRATE-0.9 % NACL/PF 100MCG/2ML
50 SYRINGE (ML) INTRAVENOUS
Refills: 0 | Status: DISCONTINUED | OUTPATIENT
Start: 2025-02-24 | End: 2025-02-24

## 2025-02-24 RX ORDER — HYDROMORPHONE/SOD CHLOR,ISO/PF 2 MG/10 ML
0.5 SYRINGE (ML) INJECTION
Refills: 0 | Status: DISCONTINUED | OUTPATIENT
Start: 2025-02-24 | End: 2025-02-24

## 2025-02-24 RX ORDER — ACETAMINOPHEN 500 MG/5ML
3 LIQUID (ML) ORAL
Refills: 0 | DISCHARGE

## 2025-02-24 RX ORDER — OXYCODONE HYDROCHLORIDE 30 MG/1
1 TABLET ORAL
Qty: 5 | Refills: 0
Start: 2025-02-24

## 2025-02-24 RX ADMIN — OXYCODONE HYDROCHLORIDE 5 MILLIGRAM(S): 30 TABLET ORAL at 10:30

## 2025-02-24 RX ADMIN — Medication 20 MILLIGRAM(S): at 02:15

## 2025-02-24 RX ADMIN — OXYCODONE HYDROCHLORIDE 5 MILLIGRAM(S): 30 TABLET ORAL at 09:50

## 2025-02-24 RX ADMIN — Medication 4 MILLIGRAM(S): at 01:46

## 2025-02-24 RX ADMIN — Medication 4 MILLIGRAM(S): at 02:15

## 2025-02-24 NOTE — ASU DISCHARGE PLAN (ADULT/PEDIATRIC) - ASU DC SPECIAL INSTRUCTIONSFT
Postoperative Instructions      Pain control: For pain control, take the following   1. Motrin 600mg four times a day, take with food  2. Add Tylenol 975 four times a day, alternated with motrin  3. Add oxycodone as needed for severe pain not managed well by motrin and tylenol. A prescription has been sent to your pharmacy on file.   Motrin and Tylenol can be obtained over the counter.    Postoperative restrictions: Do not drive or make important decisions for 24 hours after anesthesia. You may feel drowsy for 24 hours and should have a responsible adult with you during that time. Nothing in the vagina (tampons, sexual intercourse), No tub baths, pools or hot tubs for 2 weeks (showers are ok!). No lifting anything heavier than 15 lbs, no strenuous exercise for 8 weeks after surgery. Do not pull or cut any stitches that you see around your incision.    Vaginal bleeding: Spotting and intermittent passage of blood clots per vagina is normal in first few weeks after surgery. If you are soaking 1 pad per hour, that is not normal and you should notify your doctor's office and seek medical attention right away.     Vaginal discharge: Vaginal discharge (all colors) is normal after vaginal surgery. If you’ve had vaginal surgery, you have sutures in your vagina which take 3 months to fully absorb. You may have vaginal discharge during this time. This is normal.    Signs of Infection: Some postoperative pain and discomfort is normal. However, if you experience any of the following, you may be developing an infection and should be seen by your doctor: pain that does not get better with the oral medications listed above, fever greater than 100.4F, foul smelling discharge coming from the surgical site, nausea and vomiting that does not stop (especially if you are unable to tolerate oral intake), or inability to urinate. If you experience any of these symptoms, call your doctor's office to be seen right away.

## 2025-02-24 NOTE — PROGRESS NOTE ADULT - SUBJECTIVE AND OBJECTIVE BOX
ANESTHESIA POSTOP CHECK    29y Female POSTOP DAY 1    Vital Signs Last 24 Hrs  T(C): 36.3 (24 Feb 2025 09:15), Max: 36.8 (23 Feb 2025 21:58)  T(F): 97.3 (24 Feb 2025 09:15), Max: 98.3 (24 Feb 2025 01:36)  HR: 90 (24 Feb 2025 10:00) (82 - 111)  BP: 122/66 (24 Feb 2025 10:00) (102/60 - 135/76)  BP(mean): 80 (24 Feb 2025 10:00) (67 - 92)  RR: 17 (24 Feb 2025 10:00) (13 - 23)  SpO2: 97% (24 Feb 2025 10:00) (96% - 100%)    Parameters below as of 24 Feb 2025 07:15  Patient On (Oxygen Delivery Method): room air          [x ] NO APPARENT ANESTHESIA COMPLICATIONS

## 2025-02-24 NOTE — H&P ADULT - ATTENDING COMMENTS
GYN Attending    Agree  with above.  P1 with undesired IUP at 5w6d with known large left ovarian cyst presenting with acute onset abdominal pain and sonogram concerning for torsion.  Plan for emergent LSC left ovarian cystectomy, possible right, DVC for termination of pregnancy, any other indicated procedures.     I met with this patient and discussed the planned procedure, including exam under anesthesia by myself and learners (resident/medical student).  Risks of the procedure were reviewed, including but not limited to bleeding, infection, damage to surrounding organs, possibility for unplanned procedure if complications arise.  All questions were answered.     MD Amanda

## 2025-02-24 NOTE — ASU DISCHARGE PLAN (ADULT/PEDIATRIC) - CARE PROVIDER_API CALL
Lucy James  Obstetrics and Gynecology  [address] [facility] [address] [facility], [address]  [place] [address]  Phone: [phone]  Fax: [phone]  Follow Up Time:

## 2025-02-24 NOTE — ED ADULT NURSE REASSESSMENT NOTE - NS ED NURSE REASSESS COMMENT FT1
Pt returns from US, endorsing pain and N/V. ELVIA Tavarez made aware pending orders. respirations even and unlabored

## 2025-02-24 NOTE — CHART NOTE - NSCHARTNOTEFT_GEN_A_CORE
R1 OBGYN POST-OP CHECK    S: Patient seen and evaluated at bedside.  Pt awake and alert resting comfortably in bed. Patient reports pain controlled with analgesia. Pt denies N/V, SOB, CP, palpitations, fever/chills. Has not yet had anything PO. Not OOB yet. Voided 50mL in bedpan.     O:   T(C): 35.9 (02-24-25 @ 07:30), Max: 36.6 (02-24-25 @ 06:31)  HR: 99 (02-24-25 @ 08:30) (95 - 105)  BP: 107/66 (02-24-25 @ 08:30) (102/60 - 116/66)  RR: 13 (02-24-25 @ 08:30) (13 - 21)  SpO2: 96% (02-24-25 @ 08:30) (96% - 100%)  Wt(kg): --  I&O's Summary    23 Feb 2025 07:01  -  24 Feb 2025 07:00  --------------------------------------------------------  IN: 125 mL / OUT: 0 mL / NET: 125 mL    24 Feb 2025 07:01  -  24 Feb 2025 08:57  --------------------------------------------------------  IN: 125 mL / OUT: 0 mL / NET: 125 mL        Gen: Resting comfortably in bed, NAD  CV: well perfused   Lungs: no labored breathing on RA   Abd: soft, appropriately tender   Inc: Clean/dry/intact  Ext: SCD's in place and functional, non-tender b/l, no edema        A/P: 29y Female s/p Dx lsc, Lt Ov cystectomy, MATT, and DVC under sono guidance    Neuro: PO Analgesia PRN    CV: Hemodynamically stable.  Monitor VS.    Pulm: Saturating well on room air.  Encourage OOB and incentive spirometer use.   GI: Advance to regular diet. Anti-emetics PRN.  : DTV by 230pm  FEN: Electrolytes: LR@125cc/hr.   Heme: DVT ppx w/ SCD's while in bed. Early ambulation, initially with assistance then as tolerated.    ID: Afebrile  Endo: No active issues   Dispo: Discharge from PACU when criteria met.     A Sacks, PGY1

## 2025-02-24 NOTE — ASU DISCHARGE PLAN (ADULT/PEDIATRIC) - NURSING INSTRUCTIONS
You were given intravenous TYLENOL for pain management at __05:30am. Please DO NOT take any products containing TYLENOL or ACETAMINOPHEN, such as VICODIN, PERCOCET, EXCEDRIN, and any over-the-counter cold medication for the next 6 hours (until ____11:30am_). DO NOT TAKE MORE THAN 3000 MG OF TYLENOL in a 24 hour period.   You were given intravenous TORADOL for pain management at __6:10am__. Please DO NOT take ibuprofen containing products such as MOTRIN or ADVIL, or any other NSAIDs (Non-Steroidal Anti-Inflammatory Drugs) such as ALEVE for the next 6 hours (until ___12:10pm_).

## 2025-02-24 NOTE — BRIEF OPERATIVE NOTE - NSICDXBRIEFPOSTOP_GEN_ALL_CORE_FT
POST-OP DIAGNOSIS:  Ovarian torsion 24-Feb-2025 06:47:16  Luda Wooten  Ovarian cyst 24-Feb-2025 06:47:23  Luda Wooten  Intrauterine pregnancy 24-Feb-2025 06:47:39  Luda Wooten

## 2025-02-24 NOTE — H&P ADULT - NSHPLABSRESULTS_GEN_ALL_CORE
LABS:                        13.1   7.95  )-----------( 362      ( 23 Feb 2025 23:30 )             38.6     02-23    136  |  99  |  12  ----------------------------<  150[H]  3.8   |  18[L]  |  0.71    Ca    9.7      23 Feb 2025 23:30    TPro  7.5  /  Alb  4.1  /  TBili  0.4  /  DBili  x   /  AST  36[H]  /  ALT  42[H]  /  AlkPhos  55  02-23    PT/INR - ( 23 Feb 2025 23:48 )   PT: 12.1 sec;   INR: 1.04 ratio         PTT - ( 23 Feb 2025 23:48 )  PTT:29.4 sec  Urinalysis Basic - ( 23 Feb 2025 23:30 )    Color: x / Appearance: x / SG: x / pH: x  Gluc: 150 mg/dL / Ketone: x  / Bili: x / Urobili: x   Blood: x / Protein: x / Nitrite: x   Leuk Esterase: x / RBC: x / WBC x   Sq Epi: x / Non Sq Epi: x / Bacteria: x

## 2025-02-24 NOTE — BRIEF OPERATIVE NOTE - OPERATION/FINDINGS
laparoscopy: adhesion from omentum to anterior abd wall, lysed  left ovary with large ~7cm simple cyst, torsed x2. ovary detorsed, cystectomy performed with excellent hemostasis. Vistaseal placed over surgical sites  left fallopian tube grossly normal  Right ovary with 2cm dermoid cyst. right fallopian tube grossly normal  uterus grossly normal laparoscopy: adhesion from omentum to anterior abd wall, lysed  left ovary with large ~7cm simple cyst, torsed x2. ovary detorsed, cystectomy performed with excellent hemostasis. Vistaseal placed over surgical sites  left fallopian tube grossly normal  Right ovary with 2cm dermoid cyst. right fallopian tube grossly normal  uterus grossly normal    ultrasound: pre DVC with +GS/+YS/+FP, post procedure with thin endometrial stripe

## 2025-02-24 NOTE — H&P ADULT - NSHPPHYSICALEXAM_GEN_ALL_CORE
Vital Signs Last 24 Hrs  T(C): 36.8 (24 Feb 2025 01:36), Max: 36.8 (23 Feb 2025 21:58)  T(F): 98.3 (24 Feb 2025 01:36), Max: 98.3 (24 Feb 2025 01:36)  HR: 84 (24 Feb 2025 01:36) (82 - 84)  BP: 128/81 (24 Feb 2025 01:36) (128/81 - 129/81)  BP(mean): --  RR: 16 (24 Feb 2025 01:36) (16 - 16)  SpO2: 100% (24 Feb 2025 01:36) (100% - 100%)    Parameters below as of 24 Feb 2025 01:36  Patient On (Oxygen Delivery Method): room air      PHYSICAL EXAM:   Exam performed with [facility] ED PCA  Gen: NAD   Cardiovascular: Clinically well perfused  Respiratory: Breathing comfortably on RA  Abd: Soft, non-distended, no rebound, no guarding, moderate tenderness upon palpation of LLQ  Pelvic: Bimanual - +L adnexal tenderness no CMT, no adnexal palpable masses, no uterine tenderness  Extremities: Non-tender, non-edematous; able to move all ext equally  Neuro: AOx3

## 2025-02-24 NOTE — BRIEF OPERATIVE NOTE - NSICDXBRIEFPROCEDURE_GEN_ALL_CORE_FT
PROCEDURES:  Laparoscopic left ovarian cystectomy 24-Feb-2025 06:46:50 ovarian detorsion Luda Wooten  Dilation and curettage, uterus, using suction, with US guidance 24-Feb-2025 06:48:00  Luda Wooten  Lysis of pelvic adhesions 24-Feb-2025 06:48:37  Luda Wooten

## 2025-02-24 NOTE — H&P ADULT - HISTORY OF PRESENT ILLNESS
RAMIREZ SIMS    29y    Female    [id number]    HPI: 28 yo  @ 5w6d by LMP  presents with abdominal pain and nausea/vomiting. Pt reports sudden onset of LLQ abdominal pain associated with nausea/vomiting earlier this evening. Reports pain is sharp and intermittent. Last episode of emesis when she initially presented to ED.    Denies fevers, chills, lightheadedness, dizziness, chest pain, SOB, vaginal bleeding, abnl vaginal discharge.    Of note, pt has a known 8cm L ovarian cyst.       Name of GYN Physician: Aliyah  OBHx: x1 , x1 TOP, x1 SAB  GynHx: Hx cysts. Denies fibroids, endometriosis, STI's, Abnormal pap smears   PMH: Asthma  PSH: D&C  Meds: Wegovy(now has stopped)  Allx: NKDA  Social History:  Denies smoking use, drug use, alcohol use.                RAMIREZ SIMS    29y    Female    [id number]    HPI: 30 yo  @ 5w6d by LMP  presents with abdominal pain and nausea/vomiting. Pt reports sudden onset of LLQ abdominal pain associated with nausea/vomiting earlier this evening. Reports pain is sharp and intermittent. Last episode of emesis when she initially presented to ED.    Denies fevers, chills, lightheadedness, dizziness, chest pain, SOB, vaginal bleeding, abnl vaginal discharge.    Of note, pt has a known 8cm L ovarian cyst.       Name of GYN Physician: Aliyah  OBHx: x1 , x1 TOP, x1 SAB  GynHx: Hx cysts. Denies fibroids, endometriosis, STI's, Abnormal pap smears   PMH: Asthma  PSH: D&C  Meds: Wegovy(now has stopped)  Allx: NKDA  Social History:  Denies smoking use, drug use, alcohol use.       Radiology:  ACC: [id number] EXAM:  US OB TRANSVAGINAL   ORDERED BY: DAVONTE JOSHI     PROCEDURE DATE:  2025          INTERPRETATION:  CLINICAL INFORMATION: Left-sided pelvic pain    LMP: 2025    Estimated Gestational Age by LMP: 2025    COMPARISON: None available.    TECHNIQUE: Endovaginal and transabdominal pelvic sonogram. Color and   Spectral Doppler was performed.    FINDINGS:  Uterus: Single intrauterine gestation noted. A gestational sac identified   which contains a yolk sac or fetal pole. Small nabothian cysts are noted.   The cervix is closed.    Gestational Sac Size (mean): 14.5 mm  Gestational Sac Shape : Normal.  Crown Rump Length: 0.35 cm  Estimated Gestational Age: 6 weeks by crown rump length.  Yolk Sac: Normal  Fetal Heart Rate: 109 bpm    Right ovary: 2.7 cm x 2.7 cm x 3.2 cm. Right ovarian corpus luteum cyst   measuring 2 x 2.2 x 2.0 cm. Echogenic structure in the right ovary   measures 2.1 x 2.2 x 1.6 cm, which likely representing previously noted   dermoid cyst. Normal arterial and venous waveforms.  Left ovary: 7.5 cm x 5.1 cm x 8.0 cm. Complex septated cystic structure   of the left adnexa, similar in appearance compared to previous exams   measuring 6.9 x 8.2 x 4.4 cm. There is associated heterogeneous tissue   noted, possibly edematous ovarian stroma. Peripheral nonphasic flow is   noted. No arterial waveforms are demonstrated. There is an   edematous-appearing vascular pedicle.    Fluid: Small free fluid seen within the cul-de-sac and adjacent to the   left adnexa.    IMPRESSION:  Single intrauterine gestation with estimated gestational age of 6 weeks   and 0 days by crown-rump length measurement. Positive fetal heart motion   109 bpm is observed.    Complex cystic lesion of the left adnexa, felt to be ovarian in etiology.   There is peripheral nonphasic low-flow and somewhat heterogeneous   appearing ovarian stroma. The vascular pedicle is also edematous in   appearance. Possibility of ovarian torsion cannot be excluded.    Right ovarian dermoid.    Findings were discussed with ELVIA Victoria by Prashant Grigsby M.D. on   2025 at 1:58 AM.    --- End of Report ---          PRASHANT GRIGSBY MD; Resident Radiologist  This document has been electronically signed.  BHAVYA COX MD; Attending Radiologist  This document has been electronically signed. 2025  2:29AM

## 2025-02-24 NOTE — H&P ADULT - ASSESSMENT
28 yo  @ 5w6d by LMP  presents with abdominal pain and nausea/vomiting. Pt hemodynamically stable with vitals wnl. Pelvic exam +L adnexal tenderness and abd exam with moderate LLQ abd tenderness without rebound nor guarding. Sono prelim results with 8cm L ovarian cyst with decreased arterial waveforms, c/f torsion. Uterus with +GS/+YS/+FP, +FHR. Pt with likely L ovarian torsion.     Plan:     - TVUS concerning for torsion.  Patient with significant tenderness on exam and clinical picture c/w torsion.  Will admit to GYN for emergent laparoscopic cystectomy in setting of suspected active ovarian torsion.  Consents obtained. Discussed risk of possible miscarriage and pt will likely need progesterone supplementation if cyst is removed. Pt expressed understanding and signed consents.  - NPO for OR, LR@125  - IV Pain meds PRN    D/w attending physician, Dr. Erika Wooten PGY2 30 yo  @ 5w6d by LMP  presents with abdominal pain and nausea/vomiting. Pt hemodynamically stable with vitals wnl. Pelvic exam +L adnexal tenderness and abd exam with moderate LLQ abd tenderness without rebound nor guarding. Sono prelim results with 6.9 x 8.2 x 4.4 cm L ovarian cyst with decreased arterial waveforms, c/f torsion. Uterus with +GS/+YS/+FP, +FHR. Pt with likely L ovarian torsion in setting of confirmed IUP.     Plan:     - Prelim TVUS concerning for torsion.  Patient with significant tenderness on exam and clinical picture c/w torsion.  Will admit to GYN for emergent laparoscopic ovarian detorsion, ovarian cystectomy, possible oophorectomy possible exploratory laparotomy / all medically indicated procedures in setting of suspected active ovarian torsion.  Consents obtained.  - Pt with confirmed IUP on sono today. +FHR. Pt would like to terminate pregnancy. Consents signed for dilation suction curettage under ultrasound guidance   - NPO for OR, LR@125  - IV Pain meds PRN  - Anti-emetics PRN    D/w attending physician, Dr. Erika Wooten PGY2

## 2025-02-24 NOTE — ASU DISCHARGE PLAN (ADULT/PEDIATRIC) - PROCEDURE
EUA, LSC Ovarian Cystectomy, DVC EUA, LSC Ovarian detorsion, left ovarian Cystectomy, lysis of adhesions, DVC

## 2025-02-24 NOTE — BRIEF OPERATIVE NOTE - NSICDXBRIEFPREOP_GEN_ALL_CORE_FT
PRE-OP DIAGNOSIS:  Ovarian torsion 24-Feb-2025 06:46:58  Luda Wooten  Intrauterine pregnancy 24-Feb-2025 06:47:07  Luda Wooten

## 2025-02-24 NOTE — ASU DISCHARGE PLAN (ADULT/PEDIATRIC) - FINANCIAL ASSISTANCE
[facility] provides services at a reduced cost to those who are determined to be eligible through [facility]’s financial assistance program. Information regarding [facility]’s financial assistance program can be found by going to [email or web address] or by calling [phone].

## 2025-02-25 ENCOUNTER — NON-APPOINTMENT (OUTPATIENT)
Age: 30
End: 2025-02-25

## 2025-02-25 LAB
CULTURE RESULTS: SIGNIFICANT CHANGE UP
SPECIMEN SOURCE: SIGNIFICANT CHANGE UP

## 2025-03-03 LAB
ALBUMIN SERPL ELPH-MCNC: 4 G/DL
ALP BLD-CCNC: 71 U/L
ALT SERPL-CCNC: 15 U/L
ANION GAP SERPL CALC-SCNC: 13 MMOL/L
AST SERPL-CCNC: 14 U/L
BILIRUB SERPL-MCNC: 0.3 MG/DL
BUN SERPL-MCNC: 13 MG/DL
CALCIUM SERPL-MCNC: 9.2 MG/DL
CHLORIDE SERPL-SCNC: 105 MMOL/L
CHOLEST SERPL-MCNC: 189 MG/DL
CO2 SERPL-SCNC: 23 MMOL/L
CREAT SERPL-MCNC: 0.82 MG/DL
EGFR: 99 ML/MIN/1.73M2
ESTIMATED AVERAGE GLUCOSE: 117 MG/DL
GLUCOSE SERPL-MCNC: 91 MG/DL
HBA1C MFR BLD HPLC: 5.7 %
HCT VFR BLD CALC: 38.6 %
HDLC SERPL-MCNC: 47 MG/DL
HGB BLD-MCNC: 12 G/DL
LDLC SERPL CALC-MCNC: 133 MG/DL
MCHC RBC-ENTMCNC: 29.3 PG
MCHC RBC-ENTMCNC: 31.1 G/DL
MCV RBC AUTO: 94.4 FL
NONHDLC SERPL-MCNC: 142 MG/DL
PLATELET # BLD AUTO: 476 K/UL
POTASSIUM SERPL-SCNC: 5.1 MMOL/L
PROT SERPL-MCNC: 6.7 G/DL
RBC # BLD: 4.09 M/UL
RBC # FLD: 14 %
SODIUM SERPL-SCNC: 141 MMOL/L
TRIGL SERPL-MCNC: 44 MG/DL
TSH SERPL-ACNC: 0.85 UIU/ML
WBC # FLD AUTO: 5.45 K/UL

## 2025-03-04 LAB — SURGICAL PATHOLOGY STUDY: SIGNIFICANT CHANGE UP

## 2025-03-06 ENCOUNTER — APPOINTMENT (OUTPATIENT)
Dept: OBGYN | Facility: CLINIC | Age: 30
End: 2025-03-06

## 2025-03-06 PROCEDURE — 99213 OFFICE O/P EST LOW 20 MIN: CPT | Mod: 95

## 2025-03-07 ENCOUNTER — TRANSCRIPTION ENCOUNTER (OUTPATIENT)
Age: 30
End: 2025-03-07

## 2025-03-13 ENCOUNTER — TRANSCRIPTION ENCOUNTER (OUTPATIENT)
Age: 30
End: 2025-03-13

## 2025-03-18 ENCOUNTER — APPOINTMENT (OUTPATIENT)
Dept: OBGYN | Facility: HOSPITAL | Age: 30
End: 2025-03-18

## 2025-03-20 DIAGNOSIS — Z00.00 ENCOUNTER FOR GENERAL ADULT MEDICAL EXAMINATION W/OUT ABNORMAL FINDINGS: ICD-10-CM

## 2025-03-31 ENCOUNTER — APPOINTMENT (OUTPATIENT)
Dept: OBGYN | Facility: CLINIC | Age: 30
End: 2025-03-31

## 2025-06-17 ENCOUNTER — APPOINTMENT (OUTPATIENT)
Dept: INTERNAL MEDICINE | Facility: CLINIC | Age: 30
End: 2025-06-17
Payer: COMMERCIAL

## 2025-06-17 VITALS
RESPIRATION RATE: 16 BRPM | DIASTOLIC BLOOD PRESSURE: 80 MMHG | SYSTOLIC BLOOD PRESSURE: 120 MMHG | OXYGEN SATURATION: 97 % | WEIGHT: 239.25 LBS | HEIGHT: 66 IN | HEART RATE: 85 BPM | TEMPERATURE: 97.9 F | BODY MASS INDEX: 38.45 KG/M2

## 2025-06-17 PROCEDURE — 99385 PREV VISIT NEW AGE 18-39: CPT

## 2025-06-17 RX ORDER — ALBUTEROL SULFATE 90 UG/1
108 (90 BASE) INHALANT RESPIRATORY (INHALATION)
Qty: 1 | Refills: 3 | Status: ACTIVE | COMMUNITY
Start: 2025-06-17 | End: 1900-01-01

## (undated) DEVICE — TUBING SUCTION NONCONDUCTIVE 6MM X 12FT

## (undated) DEVICE — PACK PERI GYN

## (undated) DEVICE — LABELS BLANK W PEN

## (undated) DEVICE — DRSG PAD SANITARY OB

## (undated) DEVICE — PACK GENERAL LAPAROSCOPY

## (undated) DEVICE — ENDOCATCH 10MM

## (undated) DEVICE — SUT MONOCRYL 4-0 27" PS-2 UNDYED

## (undated) DEVICE — VACUUM CURETTE BUSSE HOSP CURVED 7MM

## (undated) DEVICE — DRSG TEGADERM 2.5 X 3"

## (undated) DEVICE — NDL HYPO SAFE 25G X 5/8" (ORANGE)

## (undated) DEVICE — ENDOCATCH GENERAL 10MM (PURPLE)

## (undated) DEVICE — TUBING HYDRO-SURG PLUS IRRIGATOR W SMOKEVAC & PROBE

## (undated) DEVICE — FOLEY TRAY 16FR 5CC LF UMETER CLOSED

## (undated) DEVICE — SUT VICRYL 0 27" UR-6

## (undated) DEVICE — DRAPE WARMING SOLUTION 44 X 44"

## (undated) DEVICE — BASIN SET SINGLE

## (undated) DEVICE — TUBING OLYMPUS INSUFFLATION

## (undated) DEVICE — BLADE SURGICAL #15 CARBON

## (undated) DEVICE — POSITIONER PINK PAD PIGAZZI SYSTEM

## (undated) DEVICE — TIP METZENBAUM SCISSOR MONOPOLAR ENDOCUT (ORANGE)

## (undated) DEVICE — SYR LUER LOK 50CC

## (undated) DEVICE — SOL IRR POUR NS 0.9% 500ML

## (undated) DEVICE — SYR LUER LOK 10CC

## (undated) DEVICE — TUBING TRUWAVE PRESSURE MALE/FEMALE 72"

## (undated) DEVICE — DRSG TEGADERM 1.75X1.75"

## (undated) DEVICE — UTERINE MANIPULATOR COOPER SURGICAL 5MM 33CM GREEN

## (undated) DEVICE — ENDOCATCH 5MM INZII

## (undated) DEVICE — VISITEC 4X4

## (undated) DEVICE — UTERINE MANIPULATOR CLINICAL INNOVATIONS CLEARVIEW 7CM

## (undated) DEVICE — DRAPE LIGHT HANDLE COVER (GREEN)

## (undated) DEVICE — APPLICATOR VISTASEAL LAP DUAL 35CM RIGID

## (undated) DEVICE — LIGASURE BLUNT TIP 5MM X 37CM

## (undated) DEVICE — ELCTR BOVIE PENCIL SMOKE EVACUATION

## (undated) DEVICE — INSUFFLATION NDL COVIDIEN SURGINEEDLE VERESS 120MM

## (undated) DEVICE — PACK D&C

## (undated) DEVICE — DRSG DERMABOND 0.7ML

## (undated) DEVICE — SOL IRR POUR H2O 500ML

## (undated) DEVICE — POSITIONER STRAP ARMBOARD VELCRO TS-30

## (undated) DEVICE — TROCAR COVIDIEN VERSAPORT BLADELESS OPTICAL 5MM STANDARD

## (undated) DEVICE — D HELP - CLEARVIEW CLEARIFY SYSTEM

## (undated) DEVICE — GOWN LG

## (undated) DEVICE — SUCTION YANKAUER OPEN TIP NO VENT CURVE

## (undated) DEVICE — VENODYNE/SCD SLEEVE CALF MEDIUM